# Patient Record
Sex: MALE | Race: WHITE | NOT HISPANIC OR LATINO | Employment: FULL TIME | URBAN - NONMETROPOLITAN AREA
[De-identification: names, ages, dates, MRNs, and addresses within clinical notes are randomized per-mention and may not be internally consistent; named-entity substitution may affect disease eponyms.]

---

## 2018-11-14 ENCOUNTER — HOSPITAL ENCOUNTER (OUTPATIENT)
Dept: LAB | Facility: MEDICAL CENTER | Age: 38
End: 2018-11-14
Attending: NURSE PRACTITIONER
Payer: COMMERCIAL

## 2018-11-14 ENCOUNTER — OFFICE VISIT (OUTPATIENT)
Dept: MEDICAL GROUP | Facility: PHYSICIAN GROUP | Age: 38
End: 2018-11-14
Payer: COMMERCIAL

## 2018-11-14 VITALS
DIASTOLIC BLOOD PRESSURE: 78 MMHG | HEART RATE: 61 BPM | WEIGHT: 256 LBS | SYSTOLIC BLOOD PRESSURE: 122 MMHG | TEMPERATURE: 98.2 F | BODY MASS INDEX: 37.92 KG/M2 | HEIGHT: 69 IN | RESPIRATION RATE: 16 BRPM | OXYGEN SATURATION: 100 %

## 2018-11-14 DIAGNOSIS — E78.5 DYSLIPIDEMIA: ICD-10-CM

## 2018-11-14 DIAGNOSIS — E66.09 OBESITY DUE TO EXCESS CALORIES WITHOUT SERIOUS COMORBIDITY, UNSPECIFIED CLASSIFICATION: ICD-10-CM

## 2018-11-14 DIAGNOSIS — M25.562 CHRONIC PAIN OF BOTH KNEES: ICD-10-CM

## 2018-11-14 DIAGNOSIS — M25.561 CHRONIC PAIN OF BOTH KNEES: ICD-10-CM

## 2018-11-14 DIAGNOSIS — G89.29 CHRONIC PAIN OF BOTH KNEES: ICD-10-CM

## 2018-11-14 PROCEDURE — 80053 COMPREHEN METABOLIC PANEL: CPT

## 2018-11-14 PROCEDURE — 99204 OFFICE O/P NEW MOD 45 MIN: CPT | Performed by: NURSE PRACTITIONER

## 2018-11-14 PROCEDURE — 36415 COLL VENOUS BLD VENIPUNCTURE: CPT

## 2018-11-14 PROCEDURE — 80061 LIPID PANEL: CPT

## 2018-11-14 RX ORDER — MELOXICAM 15 MG/1
15 TABLET ORAL
Qty: 30 TAB | Refills: 2 | Status: SHIPPED | OUTPATIENT
Start: 2018-11-14 | End: 2020-11-10

## 2018-11-14 ASSESSMENT — PATIENT HEALTH QUESTIONNAIRE - PHQ9: CLINICAL INTERPRETATION OF PHQ2 SCORE: 0

## 2018-11-14 ASSESSMENT — PAIN SCALES - GENERAL: PAINLEVEL: 2=MINIMAL-SLIGHT

## 2018-11-14 NOTE — ASSESSMENT & PLAN NOTE
Patient reports a history of elevated LDL cholesterol, he is interested in having lab work done today.  He is overweight, BMI is 37.8.  Working on dietary modifications to lose weight.

## 2018-11-14 NOTE — ASSESSMENT & PLAN NOTE
Patient is a 38-year-old male with bilateral chronic knee pain, persisting for many years.  Right is worse than left.  He was previously under the care of a knee specialist in [place], he had multiple cortisone injections and Synvisc in bilateral knees without significant improvement in his symptoms.  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  He tells me he was diagnosed with arthritis.  Rarely using anti-inflammatories, has a high pain threshold.  He has also tried physical therapy in the past for about 3-4 months without any improvement in his symptoms.  He denies mechanical symptoms.

## 2018-11-14 NOTE — PROGRESS NOTES
[facility]  Primary Care Office Visit - Problem-Oriented        History:     John Domínguez is a 38 y.o. male who is here today to discuss Knee Pain (bilateral )      Bilateral knee pain  Patient is a 38-year-old male with bilateral chronic knee pain, persisting for many years.  Right is worse than left.  He was previously under the care of a knee specialist in [place], he had multiple cortisone injections and Synvisc in bilateral knees without significant improvement in his symptoms.  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  He tells me he was diagnosed with arthritis.  Rarely using anti-inflammatories, has a high pain threshold.  He has also tried physical therapy in the past for about 3-4 months without any improvement in his symptoms.  He denies mechanical symptoms.           Dyslipidemia  Patient reports a history of elevated LDL cholesterol, he is interested in having lab work done today.  He is overweight, BMI is 37.8.  Working on dietary modifications to lose weight.         No past medical history on file.  No past surgical history on file.  Social History     Social History   • Marital status:      Spouse name: N/A   • Number of children: N/A   • Years of education: N/A     Occupational History   • Not on file.     Social History Main Topics   • Smoking status: Never Smoker   • Smokeless tobacco: Never Used   • Alcohol use No   • Drug use: No   • Sexual activity: No     Other Topics Concern   • Not on file     Social History Narrative   • No narrative on file     History   Smoking Status   • Never Smoker   Smokeless Tobacco   • Never Used     No family history on file.  Not on File    Problem List:     Patient Active Problem List    Diagnosis Date Noted   • Bilateral knee pain 11/14/2018   • Obesity due to excess calories without serious comorbidity 11/14/2018   • Dyslipidemia 11/14/2018         Medications:     Current  "Outpatient Prescriptions:   •  meloxicam (MOBIC) 15 MG tablet, Take 1 Tab by mouth 1 time daily as needed. Take with food., Disp: 30 Tab, Rfl: 2      Review of Systems:     Pertinent positives as per HPI, all other systems reviewed and WNL     Physical Assessment:     VS: /78 (BP Location: Right arm, Patient Position: Sitting, BP Cuff Size: Adult)   Pulse 61   Temp 36.8 °C (98.2 °F) (Temporal)   Resp 16   Ht 1.753 m (5' 9\")   Wt 116.1 kg (256 lb)   SpO2 100%   BMI 37.80 kg/m²     General: Well-developed, well-nourished, male, obese     Head: PERRL, EOMI. Normocephalic. No facial asymmetry noted.  Cardiovasc:  RRR, no MRG. No thrills or bruits. Pulses 2+ and symmetric at all distal extremities.  Pulmonary: Lungs clear bilaterally.  Normal respiratory effort. No wheeze or crackles.   Neuro: Alert and oriented, CNs II-XII intact, no focal deficits.  Skin:No rashes noted. Skin warm, dry, intact    Psych: Dressed appropriately for the weather, pleasant and conversant.  Affect, mood & judgment appropriate.    Assessment/Plan:   John was seen today for knee pain.    Diagnoses and all orders for this visit:    Chronic pain of both knees, uncontrolled   -Try meloxicam 15 mg once daily as needed, take with food.  Obtain updated imaging, referral to orthopedics for second opinion.  May be a great candidate for sports medicine referral to Dr. Samano.  Recommend weight loss.  -     REFERRAL TO ORTHOPEDICS  -     DX-KNEE COMPLETE 4+ RIGHT; Future  -     DX-KNEE COMPLETE 4+ LEFT; Future  -     meloxicam (MOBIC) 15 MG tablet; Take 1 Tab by mouth 1 time daily as needed. Take with food.    Dyslipidemia, unclear control, check labs   -     Lipid Profile; Future  -     COMP METABOLIC PANEL; Future    Obesity due to excess calories without serious comorbidity, unspecified classification  -Diet, exercise, weight loss discussed    Follow up in 1 month     Patient is agreeable to the above plan and voiced understanding. All " questions answered.     Please note that this dictation was created using voice recognition software. I have made every reasonable attempt to correct obvious errors, but I expect that there are errors of grammar and possibly content that I did not discover before finalizing the note.      STACY Loo  11/14/2018, 11:54 AM

## 2018-11-15 LAB
ALBUMIN SERPL BCP-MCNC: 4.5 G/DL (ref 3.2–4.9)
ALBUMIN/GLOB SERPL: 1.5 G/DL
ALP SERPL-CCNC: 38 U/L (ref 30–99)
ALT SERPL-CCNC: 15 U/L (ref 2–50)
ANION GAP SERPL CALC-SCNC: 8 MMOL/L (ref 0–11.9)
AST SERPL-CCNC: 16 U/L (ref 12–45)
BILIRUB SERPL-MCNC: 0.5 MG/DL (ref 0.1–1.5)
BUN SERPL-MCNC: 13 MG/DL (ref 8–22)
CALCIUM SERPL-MCNC: 9.2 MG/DL (ref 8.5–10.5)
CHLORIDE SERPL-SCNC: 102 MMOL/L (ref 96–112)
CHOLEST SERPL-MCNC: 236 MG/DL (ref 100–199)
CO2 SERPL-SCNC: 28 MMOL/L (ref 20–33)
CREAT SERPL-MCNC: 1.03 MG/DL (ref 0.5–1.4)
FASTING STATUS PATIENT QL REPORTED: NORMAL
GLOBULIN SER CALC-MCNC: 3 G/DL (ref 1.9–3.5)
GLUCOSE SERPL-MCNC: 84 MG/DL (ref 65–99)
HDLC SERPL-MCNC: 51 MG/DL
LDLC SERPL CALC-MCNC: 161 MG/DL
POTASSIUM SERPL-SCNC: 4 MMOL/L (ref 3.6–5.5)
PROT SERPL-MCNC: 7.5 G/DL (ref 6–8.2)
SODIUM SERPL-SCNC: 138 MMOL/L (ref 135–145)
TRIGL SERPL-MCNC: 122 MG/DL (ref 0–149)

## 2018-12-13 ENCOUNTER — NON-PROVIDER VISIT (OUTPATIENT)
Dept: URGENT CARE | Facility: PHYSICIAN GROUP | Age: 38
End: 2018-12-13
Payer: COMMERCIAL

## 2018-12-13 ENCOUNTER — APPOINTMENT (OUTPATIENT)
Dept: RADIOLOGY | Facility: IMAGING CENTER | Age: 38
End: 2018-12-13
Attending: NURSE PRACTITIONER
Payer: COMMERCIAL

## 2018-12-13 DIAGNOSIS — G89.29 CHRONIC PAIN OF BOTH KNEES: ICD-10-CM

## 2018-12-13 DIAGNOSIS — M25.561 CHRONIC PAIN OF BOTH KNEES: ICD-10-CM

## 2018-12-13 DIAGNOSIS — M25.562 CHRONIC PAIN OF BOTH KNEES: ICD-10-CM

## 2018-12-13 PROCEDURE — 73564 X-RAY EXAM KNEE 4 OR MORE: CPT | Mod: TC,FY,RT | Performed by: EMERGENCY MEDICINE

## 2019-02-12 ENCOUNTER — OFFICE VISIT (OUTPATIENT)
Dept: URGENT CARE | Facility: PHYSICIAN GROUP | Age: 39
End: 2019-02-12
Payer: COMMERCIAL

## 2019-02-12 VITALS
DIASTOLIC BLOOD PRESSURE: 80 MMHG | HEART RATE: 96 BPM | TEMPERATURE: 98.3 F | SYSTOLIC BLOOD PRESSURE: 116 MMHG | HEIGHT: 69 IN | RESPIRATION RATE: 16 BRPM | WEIGHT: 192 LBS | OXYGEN SATURATION: 95 % | BODY MASS INDEX: 28.44 KG/M2

## 2019-02-12 DIAGNOSIS — J10.1 INFLUENZA A: ICD-10-CM

## 2019-02-12 DIAGNOSIS — R05.9 COUGH: ICD-10-CM

## 2019-02-12 LAB
FLUAV+FLUBV AG SPEC QL IA: POSITIVE
INT CON NEG: NEGATIVE
INT CON POS: POSITIVE

## 2019-02-12 PROCEDURE — 87804 INFLUENZA ASSAY W/OPTIC: CPT | Performed by: PHYSICIAN ASSISTANT

## 2019-02-12 PROCEDURE — 99214 OFFICE O/P EST MOD 30 MIN: CPT | Performed by: PHYSICIAN ASSISTANT

## 2019-02-12 RX ORDER — DEXTROMETHORPHAN HYDROBROMIDE AND PROMETHAZINE HYDROCHLORIDE 15; 6.25 MG/5ML; MG/5ML
5 SYRUP ORAL EVERY 4 HOURS PRN
Qty: 120 ML | Refills: 0 | Status: SHIPPED | OUTPATIENT
Start: 2019-02-12 | End: 2020-11-10

## 2019-02-12 RX ORDER — OSELTAMIVIR PHOSPHATE 75 MG/1
75 CAPSULE ORAL 2 TIMES DAILY
Qty: 10 CAP | Refills: 0 | Status: SHIPPED | OUTPATIENT
Start: 2019-02-12 | End: 2019-02-17

## 2019-02-12 NOTE — PROGRESS NOTES
"  Chief Complaint   Patient presents with   • Cough       HISTORY OF PRESENT ILLNESS: Patient is a 38 y.o. male who presents today because he has a 1-2-day history of fevers, chills, body aches and a cough.  He tried some over-the-counter cough medication but has not been helping very much.    Patient Active Problem List    Diagnosis Date Noted   • Bilateral knee pain 11/14/2018   • Obesity due to excess calories without serious comorbidity 11/14/2018   • Dyslipidemia 11/14/2018       Allergies:Patient has no known allergies.    Current Outpatient Prescriptions Ordered in [facility]   Medication Sig Dispense Refill   • promethazine-dextromethorphan (PROMETHAZINE-DM) 6.25-15 MG/5ML syrup Take 5 mL by mouth every four hours as needed for Cough. 120 mL 0   • oseltamivir (TAMIFLU) 75 MG Cap Take 1 Cap by mouth 2 times a day for 5 days. 10 Cap 0   • meloxicam (MOBIC) 15 MG tablet Take 1 Tab by mouth 1 time daily as needed. Take with food. 30 Tab 2     No current Epic-ordered facility-administered medications on file.        No past medical history on file.    Social History   Substance Use Topics   • Smoking status: Never Smoker   • Smokeless tobacco: Never Used   • Alcohol use No       No family status information on file.   No family history on file.    ROS:  Review of Systems   Constitutional: Positive for fever, chills, myalgias and malaise/fatigue.   HENT: Negative for ear pain, nosebleeds, congestion, sore throat and neck pain.    Eyes: Negative for blurred vision.   Respiratory: Positive for cough, sputum production, no shortness of breath and wheezing.    Cardiovascular: Negative for chest pain, palpitations, orthopnea and leg swelling.   Gastrointestinal: Negative for heartburn, nausea, vomiting and abdominal pain.   Genitourinary: Negative for dysuria, urgency and frequency.     Exam:  Blood pressure 116/80, pulse 96, temperature 36.8 °C (98.3 °F), temperature source Temporal, resp. rate 16, height 1.753 m (5' 9\"), " weight 87.1 kg (192 lb), SpO2 95 %.  General:  Well nourished, well developed male in NAD  Head:Normocephalic, atraumatic  Eyes: PERRLA, EOM within normal limits, no conjunctival injection, no scleral icterus, visual fields and acuity grossly intact.  Ears: Normal shape and symmetry, no tenderness, no discharge. External canals are without any significant edema or erythema. Tympanic membranes are without any inflammation, no effusion. Gross auditory acuity is intact  Nose: Symmetrical without tenderness, no discharge.  Mouth: reasonable hygiene, no erythema exudates or tonsillar enlargement.  Neck: no masses, range of motion within normal limits, no tracheal deviation. No obvious thyroid enlargement.  Pulmonary: chest is symmetrical with respiration, no wheezes, crackles, or rhonchi.  Cardiovascular: regular rate and rhythm without murmurs, rubs, or gallops.  Extremities: no clubbing, cyanosis, or edema.    Positive for influenza A    Please note that this dictation was created using voice recognition software. I have made every reasonable attempt to correct obvious errors, but I expect that there are errors of grammar and possibly content that I did not discover before finalizing the note.    Assessment/Plan:  1. Influenza A  oseltamivir (TAMIFLU) 75 MG Cap   2. Cough  POCT Influenza A/B    promethazine-dextromethorphan (PROMETHAZINE-DM) 6.25-15 MG/5ML syrup   Increase p.o. fluids, Tylenol or ibuprofen as tolerated.  Followup with primary care in the next 7-10 days if not significantly improving, return to the urgent care or go to the emergency room sooner for any worsening of symptoms.

## 2019-02-12 NOTE — LETTER
February 12, 2019         Patient: John Domínguez   YOB: 1980   Date of Visit: 2/12/2019           To Whom it May Concern:    John Domínguez was seen in my clinic on 2/12/2019. He may return to work on 02/16/2019, please excuse any recent absences..    If you have any questions or concerns, please don't hesitate to call.        Sincerely,           Elio Johnston P.A.-C.  Electronically Signed

## 2020-08-16 ENCOUNTER — OFFICE VISIT (OUTPATIENT)
Dept: URGENT CARE | Facility: CLINIC | Age: 40
End: 2020-08-16
Payer: COMMERCIAL

## 2020-08-16 VITALS
SYSTOLIC BLOOD PRESSURE: 128 MMHG | OXYGEN SATURATION: 98 % | WEIGHT: 248 LBS | DIASTOLIC BLOOD PRESSURE: 84 MMHG | HEIGHT: 69 IN | RESPIRATION RATE: 16 BRPM | TEMPERATURE: 97.2 F | HEART RATE: 81 BPM | BODY MASS INDEX: 36.73 KG/M2

## 2020-08-16 DIAGNOSIS — A60.00 GENITAL HERPES SIMPLEX, UNSPECIFIED SITE: ICD-10-CM

## 2020-08-16 PROCEDURE — 99214 OFFICE O/P EST MOD 30 MIN: CPT | Performed by: NURSE PRACTITIONER

## 2020-08-16 RX ORDER — VALACYCLOVIR HYDROCHLORIDE 500 MG/1
500 TABLET, FILM COATED ORAL 2 TIMES DAILY
Qty: 20 TAB | Refills: 0 | Status: SHIPPED | OUTPATIENT
Start: 2020-08-16 | End: 2020-08-16

## 2020-08-16 RX ORDER — VALACYCLOVIR HYDROCHLORIDE 500 MG/1
500 TABLET, FILM COATED ORAL 2 TIMES DAILY
Qty: 20 TAB | Refills: 0 | Status: SHIPPED | OUTPATIENT
Start: 2020-08-16 | End: 2020-11-10 | Stop reason: SDUPTHER

## 2020-08-16 ASSESSMENT — ENCOUNTER SYMPTOMS
MEMORY LOSS: 0
MYALGIAS: 0
ORTHOPNEA: 0
VOMITING: 0
SORE THROAT: 0
CHILLS: 0
WHEEZING: 0
DIZZINESS: 0
NAUSEA: 0
COUGH: 0
SHORTNESS OF BREATH: 0
DIARRHEA: 0
CONSTIPATION: 0
TINGLING: 0
HEARTBURN: 0
BACK PAIN: 0
FEVER: 0
HEADACHES: 0
PALPITATIONS: 0

## 2020-08-17 NOTE — PROGRESS NOTES
"Subjective:      John Domínguez is a 39 y.o. male who presents with Herpes Zoster (rash between legs x 3 days)            Patient presents to urgent care with complaint of rash in his groin.  Patient has known history of genital herpes.  He has run out of his valacyclovir.  He reports that his prodrome usually starts with a rash on the legs before he has full breakout.  Requests refill of medication today    Rash  This is a new problem. Episode onset: In the past 3 days. The problem is unchanged. The affected locations include the groin. The rash is characterized by redness, itchiness and pain. Pertinent negatives include no cough, diarrhea, fever, joint pain, shortness of breath, sore throat or vomiting. Past treatments include nothing. The treatment provided no relief. (Genital herpes)       Review of Systems   Constitutional: Negative for chills and fever.   HENT: Negative for ear pain and sore throat.    Respiratory: Negative for cough, shortness of breath and wheezing.    Cardiovascular: Negative for chest pain, palpitations, orthopnea and leg swelling.   Gastrointestinal: Negative for constipation, diarrhea, heartburn, nausea and vomiting.   Musculoskeletal: Negative for back pain, joint pain and myalgias.   Skin: Positive for rash.   Neurological: Negative for dizziness, tingling and headaches.   Psychiatric/Behavioral: Negative for memory loss and suicidal ideas.   All other systems reviewed and are negative.         Objective:     /84 (BP Location: Left arm, Patient Position: Sitting, BP Cuff Size: Adult)   Pulse 81   Temp 36.2 °C (97.2 °F) (Temporal)   Resp 16   Ht 1.753 m (5' 9\")   Wt 112.5 kg (248 lb)   SpO2 98%   BMI 36.62 kg/m²      Physical Exam  Vitals signs reviewed.   Constitutional:       Appearance: Normal appearance.   HENT:      Head: Normocephalic.      Right Ear: External ear normal.      Left Ear: External ear normal.      Nose: Nose normal. No congestion.      Mouth/Throat:      " Mouth: Mucous membranes are moist.   Eyes:      Extraocular Movements: Extraocular movements intact.      Conjunctiva/sclera: Conjunctivae normal.   Neck:      Musculoskeletal: Normal range of motion.   Cardiovascular:      Rate and Rhythm: Normal rate and regular rhythm.      Pulses: Normal pulses.      Heart sounds: Normal heart sounds. No murmur.   Pulmonary:      Effort: Pulmonary effort is normal.      Breath sounds: Normal breath sounds. No wheezing.   Abdominal:      General: Abdomen is flat. Bowel sounds are normal.      Palpations: Abdomen is soft.      Tenderness: There is no abdominal tenderness.   Musculoskeletal: Normal range of motion.   Lymphadenopathy:      Cervical: No cervical adenopathy.   Skin:     General: Skin is warm and dry.      Capillary Refill: Capillary refill takes less than 2 seconds.      Findings: Rash present. Rash is vesicular.   Neurological:      Mental Status: He is alert and oriented to person, place, and time.   Psychiatric:         Mood and Affect: Mood normal.         Behavior: Behavior normal.                 Assessment/Plan:        1. Genital herpes simplex, unspecified site  valACYclovir (VALTREX) 500 MG Tab     Patient requests refill of his valacyclovir for herpes prodrome.  Patient is given extra medication until he is able to follow-up with his PCP to take at onset of prodrome.    Plan of care, medications and treatments reviewed with patient and or guardian.  Patient and or guardian voices understanding and agrees with the instructions provided. After visit summary reviewed with patient. Patient and or guardian understand the parameters for reevaluation and ER precautions discussed.     Please note that this dictation was created using voice recognition software. I have made every reasonable attempt to correct obvious errors, but I expect that there are errors of grammar and possibly content that I did not discover before finalizing the note.

## 2020-11-10 ENCOUNTER — OFFICE VISIT (OUTPATIENT)
Dept: MEDICAL GROUP | Facility: PHYSICIAN GROUP | Age: 40
End: 2020-11-10
Payer: COMMERCIAL

## 2020-11-10 ENCOUNTER — TELEPHONE (OUTPATIENT)
Dept: MEDICAL GROUP | Facility: PHYSICIAN GROUP | Age: 40
End: 2020-11-10

## 2020-11-10 VITALS
SYSTOLIC BLOOD PRESSURE: 104 MMHG | TEMPERATURE: 97.9 F | RESPIRATION RATE: 16 BRPM | OXYGEN SATURATION: 97 % | DIASTOLIC BLOOD PRESSURE: 70 MMHG | HEIGHT: 69 IN | BODY MASS INDEX: 37.47 KG/M2 | HEART RATE: 64 BPM | WEIGHT: 253 LBS

## 2020-11-10 DIAGNOSIS — E66.9 OBESITY (BMI 35.0-39.9 WITHOUT COMORBIDITY): ICD-10-CM

## 2020-11-10 DIAGNOSIS — Z30.2 ENCOUNTER FOR STERILIZATION: ICD-10-CM

## 2020-11-10 DIAGNOSIS — A60.00 GENITAL HERPES SIMPLEX, UNSPECIFIED SITE: ICD-10-CM

## 2020-11-10 DIAGNOSIS — Z30.09 VASECTOMY EVALUATION: ICD-10-CM

## 2020-11-10 DIAGNOSIS — E66.9 OBESITY (BMI 30-39.9): ICD-10-CM

## 2020-11-10 DIAGNOSIS — M25.561 CHRONIC PAIN OF BOTH KNEES: ICD-10-CM

## 2020-11-10 DIAGNOSIS — M25.562 CHRONIC PAIN OF BOTH KNEES: ICD-10-CM

## 2020-11-10 DIAGNOSIS — R14.1 GAS PAIN: ICD-10-CM

## 2020-11-10 DIAGNOSIS — E78.5 DYSLIPIDEMIA: ICD-10-CM

## 2020-11-10 DIAGNOSIS — G89.29 CHRONIC PAIN OF BOTH KNEES: ICD-10-CM

## 2020-11-10 PROCEDURE — 99214 OFFICE O/P EST MOD 30 MIN: CPT | Performed by: FAMILY MEDICINE

## 2020-11-10 RX ORDER — VALACYCLOVIR HYDROCHLORIDE 500 MG/1
500 TABLET, FILM COATED ORAL 2 TIMES DAILY
Qty: 20 TAB | Refills: 0 | Status: SHIPPED | OUTPATIENT
Start: 2020-11-10 | End: 2020-11-20

## 2020-11-10 ASSESSMENT — PATIENT HEALTH QUESTIONNAIRE - PHQ9: CLINICAL INTERPRETATION OF PHQ2 SCORE: 0

## 2020-11-10 NOTE — ASSESSMENT & PLAN NOTE
Patient is interested in ref for urology consultation.   His wife had a C section complication with blood clots after surgery, cardiomegaly and in the process of getting bariatric surgery for obesity possibly.   They have one 18 month old.   I encouraged them to be very sure of not wanting another biologic child in the future and explained a vasectomy is permanent and can only be surgically reversed.   He does still want ref to urology which I provided.

## 2020-11-10 NOTE — PROGRESS NOTES
"Subjective:   John Domínguez is a 40 y.o. male here today for evaluation and management of:     Vasectomy evaluation  Patient is interested in ref for urology consultation.   His wife had a C section complication with blood clots after surgery, cardiomegaly and in the process of getting bariatric surgery for obesity possibly.   They have one 18 month old.   I encouraged them to be very sure of not wanting another biologic child in the future and explained a vasectomy is permanent and can only be surgically reversed.   He does still want ref to urology which I provided.     Gas pain  Patient has noted a gas bubble that is difficult to burp out. Has relief after burping.   He has sometimes left upper abdomen sharp pain.   Happens after meals, occurs at rest not with exertion. Does not wake him at night. Not certain if any particular foods trigger it.   I recommended weight loss and diet low in processed foods. He is interested in keto diet and intermittent fasting.            Current medicines (including changes today)  Current Outpatient Medications   Medication Sig Dispense Refill   • valACYclovir (VALTREX) 500 MG Tab Take 1 Tab by mouth 2 times a day for 10 days. 20 Tab 0     No current facility-administered medications for this visit.      He  has no past medical history on file.    ROS  No chest pain, no shortness of breath, no abdominal pain       Objective:     /70   Pulse 64   Temp 36.6 °C (97.9 °F) (Temporal)   Resp 16   Ht 1.753 m (5' 9\")   Wt 114.8 kg (253 lb)   SpO2 97%  Body mass index is 37.36 kg/m².   Physical Exam:  Constitutional: Alert, no distress.  Skin: Warm, dry, good turgor, no rashes in visible areas.  Eye: Equal, round and reactive, conjunctiva clear, lids normal.  ENMT: Lips without lesions, good dentition, oropharynx clear.  Neck: Trachea midline, no masses, no thyromegaly. No cervical or supraclavicular lymphadenopathy  Respiratory: Unlabored respiratory effort, lungs clear to " auscultation, no wheezes, no ronchi.  Cardiovascular: Normal S1, S2, no murmur, no edema.  Abdomen: Soft, non-tender, no masses, no hepatosplenomegaly.  Psych: Alert and oriented x3, normal affect and mood.        Assessment and Plan:   The following treatment plan was discussed    1. Genital herpes simplex, unspecified site  As needed valtrex  - valACYclovir (VALTREX) 500 MG Tab; Take 1 Tab by mouth 2 times a day for 10 days.  Dispense: 20 Tab; Refill: 0    2. Encounter for sterilization  - REFERRAL TO UROLOGY    3. Vasectomy evaluation  Ref to urology provided    4. Gas pain  Encouraged weight loss, diet changes.     5. Dyslipidemia  - Comp Metabolic Panel; Future  - Lipid Profile; Future      Followup: Return in about 3 months (around 2/10/2021) for lab review, dyslipidemia, gas pain.

## 2020-11-10 NOTE — ASSESSMENT & PLAN NOTE
Sometimes feels a painful snapping in his left knee. Has bilateral knee pain. Seen by AMIRAH  Declines PT at this time.   I explained isometric knee strengthening exercises.

## 2020-11-10 NOTE — TELEPHONE ENCOUNTER
Wes Penn,   Had a great visit with John,   His wife Carolee is established with you, he tried to get an appointment with you also.   Since he lives in [place], would you be able to take him as your patient too?   Vik

## 2020-11-10 NOTE — PATIENT INSTRUCTIONS
Fasting labs in 3 months  Abdominal Bloating  When you have abdominal bloating, your abdomen may feel full, tight, or painful. It may also look bigger than normal or swollen (distended). Common causes of abdominal bloating include:  · Swallowing air.  · Constipation.  · Problems digesting food.  · Eating too much.  · Irritable bowel syndrome. This is a condition that affects the large intestine.  · Lactose intolerance. This is an inability to digest lactose, a natural sugar in dairy products.  · Celiac disease. This is a condition that affects the ability to digest gluten, a protein found in some grains.  · Gastroparesis. This is a condition that slows down the movement of food in the stomach and small intestine. It is more common in people with diabetes mellitus.  · Gastroesophageal reflux disease (GERD). This is a digestive condition that makes stomach acid flow back into the esophagus.  · Urinary retention. This means that the body is holding onto urine, and the bladder cannot be emptied all the way.  Follow these instructions at home:  Eating and drinking  · Avoid eating too much.  · Try not to swallow air while talking or eating.  · Avoid eating while lying down.  · Avoid these foods and drinks:  ? Foods that cause gas, such as broccoli, cabbage, cauliflower, and baked beans.  ? Carbonated drinks.  ? Hard candy.  ? Chewing gum.  Medicines  · Take over-the-counter and prescription medicines only as told by your health care provider.  · Take probiotic medicines. These medicines contain live bacteria or yeasts that can help digestion.  · Take coated peppermint oil capsules.  Activity  · Try to exercise regularly. Exercise may help to relieve bloating that is caused by gas and relieve constipation.  General instructions  · Keep all follow-up visits as told by your health care provider. This is important.  Contact a health care provider if:  · You have nausea and vomiting.  · You have diarrhea.  · You have abdominal  pain.  · You have unusual weight loss or weight gain.  · You have severe pain, and medicines do not help.  Get help right away if:  · You have severe chest pain.  · You have trouble breathing.  · You have shortness of breath.  · You have trouble urinating.  · You have darker urine than normal.  · You have blood in your stools or have dark, tarry stools.  Summary  · Abdominal bloating means that the abdomen is swollen.  · Common causes of abdominal bloating are swallowing air, constipation, and problems digesting food.  · Avoid eating too much and avoid swallowing air.  · Avoid foods that cause gas, carbonated drinks, hard candy, and chewing gum.  This information is not intended to replace advice given to you by your health care provider. Make sure you discuss any questions you have with your health care provider.  Document Released: 01/19/2018 Document Revised: 04/06/2020 Document Reviewed: 01/19/2018  Elsevier Patient Education © 2020 [employer]

## 2020-11-10 NOTE — ASSESSMENT & PLAN NOTE
Patient has noted a gas bubble that is difficult to burp out. Has relief after burping.   He has sometimes left upper abdomen sharp pain.   Happens after meals, occurs at rest not with exertion. Does not wake him at night. Not certain if any particular foods trigger it.   I recommended weight loss and diet low in processed foods. He is interested in keto diet and intermittent fasting.

## 2020-11-11 NOTE — TELEPHONE ENCOUNTER
Wes Merida,  Yes, I would be happy to see John.  I will have our team give him a call to schedule with me in March.  Take care,  Isamar Arrieta,   Will you call and schedule patient to see me in March for lab review and to establish care?  Thanks

## 2021-03-05 ENCOUNTER — HOSPITAL ENCOUNTER (OUTPATIENT)
Dept: LAB | Facility: MEDICAL CENTER | Age: 41
End: 2021-03-05
Attending: FAMILY MEDICINE
Payer: COMMERCIAL

## 2021-03-05 DIAGNOSIS — E78.5 DYSLIPIDEMIA: ICD-10-CM

## 2021-03-05 PROCEDURE — 80061 LIPID PANEL: CPT

## 2021-03-05 PROCEDURE — 36415 COLL VENOUS BLD VENIPUNCTURE: CPT

## 2021-03-05 PROCEDURE — 80053 COMPREHEN METABOLIC PANEL: CPT

## 2021-03-06 LAB
ALBUMIN SERPL BCP-MCNC: 4.4 G/DL (ref 3.2–4.9)
ALBUMIN/GLOB SERPL: 1.5 G/DL
ALP SERPL-CCNC: 49 U/L (ref 30–99)
ALT SERPL-CCNC: 26 U/L (ref 2–50)
ANION GAP SERPL CALC-SCNC: 11 MMOL/L (ref 7–16)
AST SERPL-CCNC: 20 U/L (ref 12–45)
BILIRUB SERPL-MCNC: 0.4 MG/DL (ref 0.1–1.5)
BUN SERPL-MCNC: 18 MG/DL (ref 8–22)
CALCIUM SERPL-MCNC: 9.2 MG/DL (ref 8.5–10.5)
CHLORIDE SERPL-SCNC: 102 MMOL/L (ref 96–112)
CHOLEST SERPL-MCNC: 222 MG/DL (ref 100–199)
CO2 SERPL-SCNC: 24 MMOL/L (ref 20–33)
CREAT SERPL-MCNC: 1.18 MG/DL (ref 0.5–1.4)
FASTING STATUS PATIENT QL REPORTED: NORMAL
GLOBULIN SER CALC-MCNC: 2.9 G/DL (ref 1.9–3.5)
GLUCOSE SERPL-MCNC: 84 MG/DL (ref 65–99)
HDLC SERPL-MCNC: 51 MG/DL
LDLC SERPL CALC-MCNC: 157 MG/DL
POTASSIUM SERPL-SCNC: 4.1 MMOL/L (ref 3.6–5.5)
PROT SERPL-MCNC: 7.3 G/DL (ref 6–8.2)
SODIUM SERPL-SCNC: 137 MMOL/L (ref 135–145)
TRIGL SERPL-MCNC: 70 MG/DL (ref 0–149)

## 2021-03-11 ENCOUNTER — OFFICE VISIT (OUTPATIENT)
Dept: MEDICAL GROUP | Facility: PHYSICIAN GROUP | Age: 41
End: 2021-03-11
Payer: COMMERCIAL

## 2021-03-11 VITALS
WEIGHT: 245 LBS | HEART RATE: 76 BPM | DIASTOLIC BLOOD PRESSURE: 82 MMHG | TEMPERATURE: 96.7 F | HEIGHT: 69 IN | SYSTOLIC BLOOD PRESSURE: 112 MMHG | OXYGEN SATURATION: 97 % | BODY MASS INDEX: 36.29 KG/M2

## 2021-03-11 DIAGNOSIS — Z13.6 SCREENING FOR CARDIOVASCULAR CONDITION: ICD-10-CM

## 2021-03-11 DIAGNOSIS — L85.8 KERATOSIS PILARIS: ICD-10-CM

## 2021-03-11 DIAGNOSIS — G89.29 CHRONIC PAIN OF BOTH KNEES: ICD-10-CM

## 2021-03-11 DIAGNOSIS — M25.562 CHRONIC PAIN OF BOTH KNEES: ICD-10-CM

## 2021-03-11 DIAGNOSIS — R14.1 GAS PAIN: ICD-10-CM

## 2021-03-11 DIAGNOSIS — E78.5 DYSLIPIDEMIA: ICD-10-CM

## 2021-03-11 DIAGNOSIS — M25.561 CHRONIC PAIN OF BOTH KNEES: ICD-10-CM

## 2021-03-11 DIAGNOSIS — Z30.09 VASECTOMY EVALUATION: ICD-10-CM

## 2021-03-11 DIAGNOSIS — F41.9 ANXIETY: ICD-10-CM

## 2021-03-11 DIAGNOSIS — Z12.5 SCREENING FOR PROSTATE CANCER: ICD-10-CM

## 2021-03-11 PROCEDURE — 99214 OFFICE O/P EST MOD 30 MIN: CPT | Performed by: NURSE PRACTITIONER

## 2021-03-11 RX ORDER — CEPHALEXIN 500 MG/1
CAPSULE ORAL
COMMUNITY
Start: 2021-01-19 | End: 2021-03-12

## 2021-03-11 ASSESSMENT — PATIENT HEALTH QUESTIONNAIRE - PHQ9: CLINICAL INTERPRETATION OF PHQ2 SCORE: 0

## 2021-03-11 NOTE — ASSESSMENT & PLAN NOTE
Patient is 40-year-old male here to establish care with me today.  Reports sensation of air bubble in his epigastrium and chest has greatly improved since he has changed his diet and lost some weight.  He is no longer drinking sodas or greasy foods.  Denies blood in stool dark black stool.

## 2021-03-11 NOTE — ASSESSMENT & PLAN NOTE
Patient is 40-year-old male here to establish care with me today.  Chronic bilateral knee pain.  Managed by AMIRAH.  Has received cortisone and Synvisc injections.  Seems to be managing with injections and lifestyle measures.  Does prohibit him from running.  Has been working on weight loss.

## 2021-03-11 NOTE — PATIENT INSTRUCTIONS

## 2021-03-12 PROBLEM — E66.9 OBESITY (BMI 30-39.9): Status: RESOLVED | Noted: 2020-11-10 | Resolved: 2021-03-12

## 2021-03-12 PROBLEM — L85.8 KERATOSIS PILARIS: Status: ACTIVE | Noted: 2021-03-12

## 2021-03-12 PROBLEM — Z30.09 VASECTOMY EVALUATION: Status: RESOLVED | Noted: 2020-11-10 | Resolved: 2021-03-12

## 2021-03-12 PROBLEM — F41.9 ANXIETY: Status: ACTIVE | Noted: 2021-03-12

## 2021-03-12 NOTE — ASSESSMENT & PLAN NOTE
Patient is 40-year-old male here to establish care with me today.  Does have history of dyslipidemia, managed with lifestyle measures. The 10-year ASCVD risk score ([place] VERON[place] Jr., et al., 2013) is: 1.1%  Component      Latest Ref Rng & Units 11/14/2018 3/5/2021           9:14 AM  8:33 AM   Cholesterol,Tot      100 - 199 mg/dL 236 (H) 222 (H)   Triglycerides      0 - 149 mg/dL 122 70   HDL      >=40 mg/dL 51 51   LDL      <100 mg/dL 161 (H) 157 (H)
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
## 2021-03-12 NOTE — ASSESSMENT & PLAN NOTE
Patient is 40-year-old male here to establish care with me today.  Reports 2-week onset of bumpy rash on upper arms and medial thighs.  Nonpruritic.  Improving since it started.  He wonders what it is.
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
## 2021-04-05 ENCOUNTER — TELEPHONE (OUTPATIENT)
Dept: MEDICAL GROUP | Facility: PHYSICIAN GROUP | Age: 41
End: 2021-04-05

## 2021-04-05 NOTE — TELEPHONE ENCOUNTER
Phone Number Called: [phone] (home) [phone] (work)      Call outcome: Left detailed message for patient. Informed to call back with any additional questions.    Message: Called to inform patient that cholesterol is slightly elevated and if he wants to discuss this to please make an appointment with Dr. Merida

## 2021-06-23 ENCOUNTER — OFFICE VISIT (OUTPATIENT)
Dept: MEDICAL GROUP | Facility: PHYSICIAN GROUP | Age: 41
End: 2021-06-23
Payer: COMMERCIAL

## 2021-06-23 VITALS
SYSTOLIC BLOOD PRESSURE: 120 MMHG | HEIGHT: 69 IN | HEART RATE: 78 BPM | DIASTOLIC BLOOD PRESSURE: 76 MMHG | WEIGHT: 246.3 LBS | RESPIRATION RATE: 12 BRPM | BODY MASS INDEX: 36.48 KG/M2 | OXYGEN SATURATION: 96 % | TEMPERATURE: 98.6 F

## 2021-06-23 DIAGNOSIS — M79.675 TOE PAIN, LEFT: ICD-10-CM

## 2021-06-23 DIAGNOSIS — L08.9 SKIN INFECTION: ICD-10-CM

## 2021-06-23 PROCEDURE — 99213 OFFICE O/P EST LOW 20 MIN: CPT | Performed by: NURSE PRACTITIONER

## 2021-06-23 NOTE — ASSESSMENT & PLAN NOTE
Patient reports left toe pain/injury.  Stubbed his toe on child's toy for 5 days ago.  Initially had severe pain.  Seem to ease up over the next couple days.  In the last few days pain has not been getting better.  Mild swelling.  Denies decreased sensation or coolness.

## 2021-06-23 NOTE — PROGRESS NOTES
"CC: Toe injury/pain    HISTORY OF THE PRESENT ILLNESS: Patient is a 40 y.o. male. This pleasant patient is here today for evaluation and management of the following health problems.        Toe pain, left  Patient reports left toe pain/injury.  Stubbed his toe on child's toy for 5 days ago.  Initially had severe pain.  Seem to ease up over the next couple days.  In the last few days pain has not been getting better.  Mild swelling.  Denies decreased sensation or coolness.    Skin infection  Patient reports he scraped his left anterior shin on piece of metal at work.  Has been cleaning it with hydrogen peroxide and applying Neosporin ointment.  Surrounded by mild erythema.  Denies drainage, fever.      Allergies: Patient has no known allergies.    Current Outpatient Medications Ordered in Epic   Medication Sig Dispense Refill   • mupirocin (BACTROBAN) 2 % Ointment Apply 1 Application topically 2 times a day. 22 g 0     No current Epic-ordered facility-administered medications on file.       History reviewed. No pertinent past medical history.    Past Surgical History:   Procedure Laterality Date   • VASECTOMY         Social History     Tobacco Use   • Smoking status: Never Smoker   • Smokeless tobacco: Never Used   Substance Use Topics   • Alcohol use: No   • Drug use: No       History reviewed. No pertinent family history.    ROS:   As in HPI, otherwise negative for chest pain, dyspnea, abdominal pain, dysuria, blood in stool, fever           Exam: /76 (BP Location: Right arm, Patient Position: Sitting, BP Cuff Size: Adult long)   Pulse 78   Temp 37 °C (98.6 °F) (Temporal)   Resp 12   Ht 1.753 m (5' 9\")   Wt 112 kg (246 lb 4.8 oz)   SpO2 96%  Body mass index is 36.37 kg/m².    General: Alert, well nourished, well developed male in NAD  Neck: Supple  Pulmonary:  Normal effort.   Neurologic: Grossly nonfocal  Left lower extremity: Anterior shin with 2 cm crusting with 1 cm surrounding erythema, dry.  Left " fifth toe with mild edema and small serous blister at nailbed, sensation intact, full active range of motion, pedal pulses palpable.  Skin: Warm and dry.    Musculoskeletal: Normal gait.   Psych: Normal mood and affect. Alert and oriented. Judgment and insight is normal.    Please note that this dictation was created using voice recognition software. I have made every reasonable attempt to correct obvious errors, but I expect that there are errors of grammar and possibly content that I did not discover before finalizing the note.      Assessment/Plan  1. Toe pain, left  We will get foot x-ray.  Did advise patient that if it does come back as a fracture, that treatment will be to continue with wearing stiff soled shoes.  Did advise on ice 3 times a day.    - DX-FOOT-COMPLETE 3+ LEFT; Future    2. Skin infection  Mild infection.  Will apply mupirocin twice daily.  Patient will return to clinic or urgent care if wound becomes purulent or erythema expands.  - mupirocin (BACTROBAN) 2 % Ointment; Apply 1 Application topically 2 times a day.  Dispense: 22 g; Refill: 0

## 2021-06-23 NOTE — ASSESSMENT & PLAN NOTE
Patient reports he scraped his left anterior shin on piece of metal at work.  Has been cleaning it with hydrogen peroxide and applying Neosporin ointment.  Surrounded by mild erythema.  Denies drainage, fever.

## 2021-06-24 ENCOUNTER — APPOINTMENT (OUTPATIENT)
Dept: RADIOLOGY | Facility: IMAGING CENTER | Age: 41
End: 2021-06-24
Attending: NURSE PRACTITIONER
Payer: COMMERCIAL

## 2021-06-24 ENCOUNTER — APPOINTMENT (OUTPATIENT)
Dept: URGENT CARE | Facility: PHYSICIAN GROUP | Age: 41
End: 2021-06-24
Payer: COMMERCIAL

## 2021-06-24 DIAGNOSIS — M79.675 TOE PAIN, LEFT: ICD-10-CM

## 2021-06-24 PROCEDURE — 73630 X-RAY EXAM OF FOOT: CPT | Mod: TC,FY,LT | Performed by: NURSE PRACTITIONER

## 2021-06-28 ENCOUNTER — PATIENT MESSAGE (OUTPATIENT)
Dept: MEDICAL GROUP | Facility: PHYSICIAN GROUP | Age: 41
End: 2021-06-28

## 2021-06-28 DIAGNOSIS — L08.9 SKIN INFECTION: ICD-10-CM

## 2021-06-29 RX ORDER — SULFAMETHOXAZOLE AND TRIMETHOPRIM 800; 160 MG/1; MG/1
1 TABLET ORAL 2 TIMES DAILY
Qty: 14 TABLET | Refills: 0 | Status: SHIPPED | OUTPATIENT
Start: 2021-06-29 | End: 2022-03-11

## 2021-12-25 ENCOUNTER — HOSPITAL ENCOUNTER (OUTPATIENT)
Facility: MEDICAL CENTER | Age: 41
End: 2021-12-25
Attending: PHYSICIAN ASSISTANT
Payer: COMMERCIAL

## 2021-12-25 ENCOUNTER — OFFICE VISIT (OUTPATIENT)
Dept: URGENT CARE | Facility: PHYSICIAN GROUP | Age: 41
End: 2021-12-25
Payer: COMMERCIAL

## 2021-12-25 VITALS
WEIGHT: 256 LBS | HEIGHT: 69 IN | BODY MASS INDEX: 37.92 KG/M2 | RESPIRATION RATE: 16 BRPM | OXYGEN SATURATION: 95 % | HEART RATE: 92 BPM | DIASTOLIC BLOOD PRESSURE: 80 MMHG | SYSTOLIC BLOOD PRESSURE: 110 MMHG | TEMPERATURE: 98.1 F

## 2021-12-25 DIAGNOSIS — J02.9 SORE THROAT: ICD-10-CM

## 2021-12-25 DIAGNOSIS — J06.9 UPPER RESPIRATORY TRACT INFECTION, UNSPECIFIED TYPE: ICD-10-CM

## 2021-12-25 DIAGNOSIS — J02.0 STREP PHARYNGITIS: ICD-10-CM

## 2021-12-25 DIAGNOSIS — R05.9 COUGH: ICD-10-CM

## 2021-12-25 LAB
INT CON NEG: NEGATIVE
INT CON POS: POSITIVE
S PYO AG THROAT QL: POSITIVE

## 2021-12-25 PROCEDURE — 87880 STREP A ASSAY W/OPTIC: CPT | Performed by: PHYSICIAN ASSISTANT

## 2021-12-25 PROCEDURE — U0003 INFECTIOUS AGENT DETECTION BY NUCLEIC ACID (DNA OR RNA); SEVERE ACUTE RESPIRATORY SYNDROME CORONAVIRUS 2 (SARS-COV-2) (CORONAVIRUS DISEASE [COVID-19]), AMPLIFIED PROBE TECHNIQUE, MAKING USE OF HIGH THROUGHPUT TECHNOLOGIES AS DESCRIBED BY CMS-2020-01-R: HCPCS

## 2021-12-25 PROCEDURE — 99213 OFFICE O/P EST LOW 20 MIN: CPT | Mod: CS | Performed by: PHYSICIAN ASSISTANT

## 2021-12-25 RX ORDER — DEXTROMETHORPHAN HYDROBROMIDE AND PROMETHAZINE HYDROCHLORIDE 15; 6.25 MG/5ML; MG/5ML
5 SYRUP ORAL EVERY 4 HOURS PRN
Qty: 120 ML | Refills: 0 | Status: SHIPPED | OUTPATIENT
Start: 2021-12-25 | End: 2022-03-11

## 2021-12-25 RX ORDER — AMOXICILLIN 875 MG/1
875 TABLET, COATED ORAL 2 TIMES DAILY
Qty: 20 TABLET | Refills: 0 | Status: SHIPPED | OUTPATIENT
Start: 2021-12-25 | End: 2022-01-04

## 2021-12-25 NOTE — PROGRESS NOTES
Chief Complaint   Patient presents with   • Cough     3- 4 day   • Pharyngitis     scratchy throat x 4 days   • Fever     only had fever 1 day       HISTORY OF PRESENT ILLNESS: Patient is a 41 y.o. male who presents today because he has a 3 to 4-day history of cough, sore throat, had fever on day 1 or 2.  He has not been taking any medications for his current symptoms other than Tylenol.  Denies any nausea, vomiting or diarrhea.  He is vaccinated for Covid.    Patient Active Problem List    Diagnosis Date Noted   • Toe pain, left 06/23/2021   • Skin infection 06/23/2021   • Anxiety 03/12/2021   • Keratosis pilaris 03/12/2021   • Gas pain 11/10/2020   • Obesity (BMI 35.0-39.9 without comorbidity) ([facility]) 11/10/2020   • Bilateral knee pain 11/14/2018   • Obesity due to excess calories without serious comorbidity 11/14/2018   • Dyslipidemia 11/14/2018       Allergies:Patient has no known allergies.    Current Outpatient Medications Ordered in Epic   Medication Sig Dispense Refill   • promethazine-dextromethorphan (PROMETHAZINE-DM) 6.25-15 MG/5ML syrup Take 5 mL by mouth every four hours as needed for Cough. 120 mL 0   • diphenhydrAMINE-lidocaine-Maalox (MBX) oral susp cup Take 5 mL by mouth every 6 hours as needed. 90 mL 0   • amoxicillin (AMOXIL) 875 MG tablet Take 1 Tablet by mouth 2 times a day for 10 days. 20 Tablet 0   • sulfamethoxazole-trimethoprim (BACTRIM DS) 800-160 MG tablet Take 1 tablet by mouth 2 times a day. 14 tablet 0   • mupirocin (BACTROBAN) 2 % Ointment Apply 1 Application topically 2 times a day. 22 g 0     Current Facility-Administered Medications Ordered in Epic   Medication Dose Route Frequency Provider Last Rate Last Admin   • cefTRIAXone (ROCEPHIN) 500 mg, lidocaine (XYLOCAINE) 1 % 1.8 mL for IM use  500 mg Intramuscular Once Elio Johnston P.A.-C.           History reviewed. No pertinent past medical history.    Social History     Tobacco Use   • Smoking status: Never Smoker   • Smokeless  "tobacco: Never Used   Substance Use Topics   • Alcohol use: Yes     Comment: very rare   • Drug use: No       No family status information on file.   History reviewed. No pertinent family history.    ROS:  Review of Systems   Constitutional: Positive for 1 day of resolved fever, no current chills, weight loss and malaise/fatigue.   HENT: Negative for ear pain, nosebleeds, positive for congestion, sore throat and no neck pain.    Eyes: Negative for blurred vision.   Respiratory: Positive for cough, minimal sputum production, no shortness of breath and wheezing.    Cardiovascular: Negative for chest pain, palpitations, orthopnea and leg swelling.   Gastrointestinal: Negative for heartburn, nausea, vomiting and abdominal pain.   Genitourinary: Negative for dysuria, urgency and frequency.     Exam:  /80   Pulse 92   Temp 36.7 °C (98.1 °F) (Temporal)   Resp 16   Ht 1.753 m (5' 9\")   Wt 116 kg (256 lb)   SpO2 95%   General:  Well nourished, well developed male in NAD  Head:Normocephalic, atraumatic  Eyes: PERRLA, EOM within normal limits, no conjunctival injection, no scleral icterus, visual fields and acuity grossly intact.  Ears: Normal shape and symmetry, no tenderness, no discharge. External canals are without any significant edema or erythema. Tympanic membranes are without any inflammation, no effusion. Gross auditory acuity is intact  Nose: Symmetrical without tenderness, no discharge.  Mouth: reasonable hygiene, no erythema exudates or tonsillar enlargement.  Neck: no masses, range of motion within normal limits, no tracheal deviation. No obvious thyroid enlargement.  Pulmonary: chest is symmetrical with respiration, no wheezes, crackles, or rhonchi.  Cardiovascular: regular rate and rhythm without murmurs, rubs, or gallops.  Extremities: no clubbing, cyanosis, or edema.    Strep test is positive    Please note that this dictation was created using voice recognition software. I have made every " reasonable attempt to correct obvious errors, but I expect that there are errors of grammar and possibly content that I did not discover before finalizing the note.    Assessment/Plan:  1. Sore throat  POCT Rapid Strep A    diphenhydrAMINE-lidocaine-Maalox (MBX) oral susp cup   2. Upper respiratory tract infection, unspecified type  SARS-CoV-2 PCR (24 hour In-House): Collect NP swab in VTM   3. Cough  promethazine-dextromethorphan (PROMETHAZINE-DM) 6.25-15 MG/5ML syrup   4. Strep pharyngitis  amoxicillin (AMOXIL) 875 MG tablet    cefTRIAXone (ROCEPHIN) 500 mg, lidocaine (XYLOCAINE) 1 % 1.8 mL for IM use   Discussed over-the-counter symptomatically, strict isolation until Covid test returns.  No pharmacies are open on [place] [facility] today, will administer 500 Rocephin in the office, prescription written for amoxicillin    Followup with primary care in the next 7-10 days if not significantly improving, return to the urgent care or go to the emergency room sooner for any worsening of symptoms.

## 2021-12-25 NOTE — PATIENT INSTRUCTIONS

## 2021-12-27 DIAGNOSIS — J06.9 UPPER RESPIRATORY TRACT INFECTION, UNSPECIFIED TYPE: ICD-10-CM

## 2021-12-27 LAB — COVID ORDER STATUS COVID19: NORMAL

## 2021-12-28 LAB
SARS-COV-2 RNA RESP QL NAA+PROBE: NOTDETECTED
SPECIMEN SOURCE: NORMAL

## 2022-01-26 ENCOUNTER — OFFICE VISIT (OUTPATIENT)
Dept: URGENT CARE | Facility: PHYSICIAN GROUP | Age: 42
End: 2022-01-26
Payer: COMMERCIAL

## 2022-01-26 ENCOUNTER — HOSPITAL ENCOUNTER (OUTPATIENT)
Facility: MEDICAL CENTER | Age: 42
End: 2022-01-26
Attending: NURSE PRACTITIONER
Payer: COMMERCIAL

## 2022-01-26 VITALS
HEIGHT: 69 IN | HEART RATE: 98 BPM | DIASTOLIC BLOOD PRESSURE: 72 MMHG | BODY MASS INDEX: 37.47 KG/M2 | SYSTOLIC BLOOD PRESSURE: 118 MMHG | WEIGHT: 253 LBS | TEMPERATURE: 97.8 F | OXYGEN SATURATION: 97 % | RESPIRATION RATE: 16 BRPM

## 2022-01-26 DIAGNOSIS — U07.1 COVID: ICD-10-CM

## 2022-01-26 PROCEDURE — U0005 INFEC AGEN DETEC AMPLI PROBE: HCPCS

## 2022-01-26 PROCEDURE — 99213 OFFICE O/P EST LOW 20 MIN: CPT | Mod: CS | Performed by: NURSE PRACTITIONER

## 2022-01-26 PROCEDURE — U0003 INFECTIOUS AGENT DETECTION BY NUCLEIC ACID (DNA OR RNA); SEVERE ACUTE RESPIRATORY SYNDROME CORONAVIRUS 2 (SARS-COV-2) (CORONAVIRUS DISEASE [COVID-19]), AMPLIFIED PROBE TECHNIQUE, MAKING USE OF HIGH THROUGHPUT TECHNOLOGIES AS DESCRIBED BY CMS-2020-01-R: HCPCS

## 2022-01-26 ASSESSMENT — ENCOUNTER SYMPTOMS
SPUTUM PRODUCTION: 0
CHILLS: 0
WHEEZING: 0
PALPITATIONS: 0
ORTHOPNEA: 0
MYALGIAS: 1
FEVER: 0
SORE THROAT: 1
HEMOPTYSIS: 0
COUGH: 1
SHORTNESS OF BREATH: 0

## 2022-01-26 NOTE — PROGRESS NOTES
"Subjective     John Domínguez is a 41 y.o. male who presents with Runny Nose (test pso for strep at last visit, lingering cough ) and Cough (sunday-monday started )            John comes in today with a 3 day history of cough with runny nose and sore throat.  He had strep throat last month but took antibiotics with good resolution of pharyngitis until this week with new onset of symptoms.  No chest pain or shortness of breath.        Review of Systems   Constitutional: Positive for malaise/fatigue. Negative for chills and fever.   HENT: Positive for congestion and sore throat. Negative for ear pain.    Respiratory: Positive for cough. Negative for hemoptysis, sputum production, shortness of breath and wheezing.    Cardiovascular: Negative for chest pain, palpitations, orthopnea and leg swelling.   Musculoskeletal: Positive for myalgias.     Medications, Allergies, and current problem list reviewed today in Epic         Objective     Blood Pressure 118/72 (BP Location: Right arm, Patient Position: Sitting, BP Cuff Size: Adult)   Pulse 98   Temperature 36.6 °C (97.8 °F) (Temporal)   Respiration 16   Height 1.753 m (5' 9\")   Weight 115 kg (253 lb)   Oxygen Saturation 97%   Body Mass Index 37.36 kg/m²      Physical Exam  Vitals reviewed.   Constitutional:       General: He is not in acute distress.     Appearance: He is well-developed. He is not diaphoretic.   HENT:      Right Ear: Tympanic membrane, ear canal and external ear normal. There is no impacted cerumen.      Left Ear: Tympanic membrane, ear canal and external ear normal. There is no impacted cerumen.      Nose: Congestion and rhinorrhea present.      Mouth/Throat:      Mouth: Mucous membranes are moist.      Pharynx: No oropharyngeal exudate or posterior oropharyngeal erythema.   Eyes:      General: No scleral icterus.        Right eye: No discharge.         Left eye: No discharge.      Conjunctiva/sclera: Conjunctivae normal.      Pupils: Pupils are " equal, round, and reactive to light.   Neck:      Thyroid: No thyromegaly.      Vascular: No JVD.      Trachea: No tracheal deviation.   Cardiovascular:      Rate and Rhythm: Normal rate and regular rhythm.      Heart sounds: Normal heart sounds. No murmur heard.  No friction rub. No gallop.    Pulmonary:      Effort: Pulmonary effort is normal. No respiratory distress.      Breath sounds: Normal breath sounds. No stridor. No wheezing, rhonchi or rales.   Chest:      Chest wall: No tenderness.   Musculoskeletal:      Cervical back: Neck supple.   Lymphadenopathy:      Cervical: No cervical adenopathy.   Skin:     General: Skin is warm and dry.      Coloration: Skin is not pale.      Findings: No erythema.   Neurological:      Mental Status: He is alert and oriented to person, place, and time.   Psychiatric:         Mood and Affect: Mood normal.                             Assessment & Plan        1. URI with cough and congestion  - SARS-CoV-2, PCR (In-House); Future    Advised John that based on the history and exam findings, this is likely a self-limiting viral illness.  There is no indication for antibiotics at this time.  Differential reviewed.  At home isolation recommended pending covid test results.  OTC cold medications prn symptom management.  OTC NSAIDs or tylenol prn fever, pain.  Maintain adequate po hydration.  RTC in 5-7 days if symptoms persist, sooner if worse.  ED precautions reviewed.  He verbalized understanding of and agreed with plan of care.    Addendum :Notified John of positive covid result via [employer].  1. COVID  SARS-CoV-2, PCR (In-House)

## 2022-01-27 DIAGNOSIS — J06.9 URI WITH COUGH AND CONGESTION: ICD-10-CM

## 2022-01-27 LAB — COVID ORDER STATUS COVID19: NORMAL

## 2022-01-28 LAB
SARS-COV-2 RNA RESP QL NAA+PROBE: DETECTED
SPECIMEN SOURCE: ABNORMAL

## 2022-03-10 ENCOUNTER — HOSPITAL ENCOUNTER (OUTPATIENT)
Dept: LAB | Facility: MEDICAL CENTER | Age: 42
End: 2022-03-10
Attending: NURSE PRACTITIONER
Payer: COMMERCIAL

## 2022-03-10 DIAGNOSIS — Z13.6 SCREENING FOR CARDIOVASCULAR CONDITION: ICD-10-CM

## 2022-03-10 DIAGNOSIS — Z12.5 SCREENING FOR PROSTATE CANCER: ICD-10-CM

## 2022-03-10 LAB
ALBUMIN SERPL BCP-MCNC: 4.9 G/DL (ref 3.2–4.9)
ALBUMIN/GLOB SERPL: 1.9 G/DL
ALP SERPL-CCNC: 46 U/L (ref 30–99)
ALT SERPL-CCNC: 21 U/L (ref 2–50)
ANION GAP SERPL CALC-SCNC: 10 MMOL/L (ref 7–16)
AST SERPL-CCNC: 19 U/L (ref 12–45)
BILIRUB SERPL-MCNC: 0.5 MG/DL (ref 0.1–1.5)
BUN SERPL-MCNC: 18 MG/DL (ref 8–22)
CALCIUM SERPL-MCNC: 9.1 MG/DL (ref 8.5–10.5)
CHLORIDE SERPL-SCNC: 102 MMOL/L (ref 96–112)
CHOLEST SERPL-MCNC: 241 MG/DL (ref 100–199)
CO2 SERPL-SCNC: 26 MMOL/L (ref 20–33)
CREAT SERPL-MCNC: 1.09 MG/DL (ref 0.5–1.4)
ERYTHROCYTE [DISTWIDTH] IN BLOOD BY AUTOMATED COUNT: 42.8 FL (ref 35.9–50)
FASTING STATUS PATIENT QL REPORTED: NORMAL
GLOBULIN SER CALC-MCNC: 2.6 G/DL (ref 1.9–3.5)
GLUCOSE SERPL-MCNC: 79 MG/DL (ref 65–99)
HCT VFR BLD AUTO: 49.5 % (ref 42–52)
HDLC SERPL-MCNC: 53 MG/DL
HGB BLD-MCNC: 16.3 G/DL (ref 14–18)
LDLC SERPL CALC-MCNC: 169 MG/DL
MCH RBC QN AUTO: 31 PG (ref 27–33)
MCHC RBC AUTO-ENTMCNC: 32.9 G/DL (ref 33.7–35.3)
MCV RBC AUTO: 94.1 FL (ref 81.4–97.8)
PLATELET # BLD AUTO: 185 K/UL (ref 164–446)
PMV BLD AUTO: 10.8 FL (ref 9–12.9)
POTASSIUM SERPL-SCNC: 4.9 MMOL/L (ref 3.6–5.5)
PROT SERPL-MCNC: 7.5 G/DL (ref 6–8.2)
PSA SERPL-MCNC: 0.77 NG/ML (ref 0–4)
RBC # BLD AUTO: 5.26 M/UL (ref 4.7–6.1)
SODIUM SERPL-SCNC: 138 MMOL/L (ref 135–145)
TRIGL SERPL-MCNC: 95 MG/DL (ref 0–149)
WBC # BLD AUTO: 5.2 K/UL (ref 4.8–10.8)

## 2022-03-10 PROCEDURE — 36415 COLL VENOUS BLD VENIPUNCTURE: CPT

## 2022-03-10 PROCEDURE — 84153 ASSAY OF PSA TOTAL: CPT

## 2022-03-10 PROCEDURE — 80061 LIPID PANEL: CPT

## 2022-03-10 PROCEDURE — 85027 COMPLETE CBC AUTOMATED: CPT

## 2022-03-10 PROCEDURE — 80053 COMPREHEN METABOLIC PANEL: CPT

## 2022-03-11 ENCOUNTER — OFFICE VISIT (OUTPATIENT)
Dept: MEDICAL GROUP | Facility: PHYSICIAN GROUP | Age: 42
End: 2022-03-11
Payer: COMMERCIAL

## 2022-03-11 VITALS
DIASTOLIC BLOOD PRESSURE: 76 MMHG | WEIGHT: 247 LBS | TEMPERATURE: 97.1 F | HEIGHT: 69 IN | RESPIRATION RATE: 12 BRPM | OXYGEN SATURATION: 99 % | SYSTOLIC BLOOD PRESSURE: 120 MMHG | HEART RATE: 62 BPM | BODY MASS INDEX: 36.58 KG/M2

## 2022-03-11 DIAGNOSIS — Z12.5 SCREENING FOR PROSTATE CANCER: ICD-10-CM

## 2022-03-11 DIAGNOSIS — M54.50 ACUTE BILATERAL LOW BACK PAIN WITHOUT SCIATICA: ICD-10-CM

## 2022-03-11 DIAGNOSIS — G89.29 CHRONIC PAIN OF BOTH KNEES: ICD-10-CM

## 2022-03-11 DIAGNOSIS — M25.561 CHRONIC PAIN OF BOTH KNEES: ICD-10-CM

## 2022-03-11 DIAGNOSIS — E78.5 DYSLIPIDEMIA: ICD-10-CM

## 2022-03-11 DIAGNOSIS — D22.9 ATYPICAL NEVI: ICD-10-CM

## 2022-03-11 DIAGNOSIS — M25.562 CHRONIC PAIN OF BOTH KNEES: ICD-10-CM

## 2022-03-11 DIAGNOSIS — F41.9 ANXIETY: ICD-10-CM

## 2022-03-11 DIAGNOSIS — Z13.6 SCREENING FOR CARDIOVASCULAR CONDITION: ICD-10-CM

## 2022-03-11 PROBLEM — M79.675 TOE PAIN, LEFT: Status: RESOLVED | Noted: 2021-06-23 | Resolved: 2022-03-11

## 2022-03-11 PROBLEM — L08.9 SKIN INFECTION: Status: RESOLVED | Noted: 2021-06-23 | Resolved: 2022-03-11

## 2022-03-11 PROCEDURE — 99213 OFFICE O/P EST LOW 20 MIN: CPT | Performed by: NURSE PRACTITIONER

## 2022-03-11 ASSESSMENT — PATIENT HEALTH QUESTIONNAIRE - PHQ9
CLINICAL INTERPRETATION OF PHQ2 SCORE: 1
5. POOR APPETITE OR OVEREATING: 0 - NOT AT ALL
SUM OF ALL RESPONSES TO PHQ QUESTIONS 1-9: 1

## 2022-03-11 ASSESSMENT — FIBROSIS 4 INDEX: FIB4 SCORE: 0.92

## 2022-03-11 NOTE — ASSESSMENT & PLAN NOTE
Patient reports intermittent low back pain.  Will flareup after working on a project while bending over a lot.  Currently working on building play area for his son.  Was really bad 3 days ago.  Improving.  Pain is in low back, no leg pain.  Aggravated by flexion.  Has not tried ice or anti-inflammatory.

## 2022-03-11 NOTE — PROGRESS NOTES
"CC: Lab review    HISTORY OF THE PRESENT ILLNESS: Patient is a 41 y.o. male. This pleasant patient is here today for evaluation and management of the following health problems.        Anxiety  Chronic problem.  Now established with counselor here in town.  Has gone to 3 or 4 sessions.  Thinks it is helping.  [facility].  He never heard back from [facility].    Bilateral knee pain  Continues with bilateral chronic knee pain.  Aggravated by going up stairs.  I see in [place] [facility] receiving injections a couple years ago.  Does not like to take medications.  Not using ice.  Working on weight loss.      Dyslipidemia  This is a chronic health problem that is well controlled with current  lifestyle measures.  The 10-year ASCVD risk score ([place]esteban VERONICA Jr., et al., 2013) is: 1.5%  Working on \"[employer] diet.\"  Has been able to lose 13 pounds.    Acute bilateral low back pain without sciatica  Patient reports intermittent low back pain.  Will flareup after working on a project while bending over a lot.  Currently working on building play area for his son.  Was really bad 3 days ago.  Improving.  Pain is in low back, no leg pain.  Aggravated by flexion.  Has not tried ice or anti-inflammatory.      Allergies: Patient has no known allergies.    No current [employer]-ordered outpatient medications on file.     No current [employer]-ordered facility-administered medications on file.       History reviewed. No pertinent past medical history.    Past Surgical History:   Procedure Laterality Date   • VASECTOMY         Social History     Tobacco Use   • Smoking status: Never Smoker   • Smokeless tobacco: Never Used   Substance Use Topics   • Alcohol use: Yes     Comment: very rare   • Drug use: No       History reviewed. No pertinent family history.    ROS: As in HPI, otherwise negative for chest pain, dyspnea, abdominal pain, dysuria, blood in stool, fever             Exam: /76 (BP Location: Left arm, Patient " "Position: Sitting, BP Cuff Size: Adult)   Pulse 62   Temp 36.2 °C (97.1 °F) (Temporal)   Resp 12   Ht 1.753 m (5' 9\")   Wt 112 kg (247 lb)   SpO2 99%  Body mass index is 36.48 kg/m².    General: Alert, pleasant, well nourished, well developed male in NAD  HEENT: Normocephalic. Eyes conjunctiva clear lids without ptosis, pupils equal and reactive to light, ears normal shape and contour, canals are clear bilaterally, tympanic membranes are pearly gray with good light reflex, nasal mucosa without erythema and drainage, oropharynx is without erythema, edema or exudates.   Neck: Supple without bruit. Thyroid is not enlarged.  Pulmonary: Clear to ausculation.  Normal effort. No rales, ronchi, or wheezing.  Cardiovascular: Normal rate and rhythm without murmur. Carotid and radial pulses are intact and equal bilaterally.  No lower extremity edema.  Abdomen: Soft, nontender, nondistended. Normal bowel sounds. Liver and spleen are not palpable  Low back: no erythema or edema, nontender to palpation, able to tiptoe and heel walk, full active ROM, patellar DTR's 1+ and equal bilaterally  Neurologic: Grossly nonfocal  Lymph: No cervical or supraclavicular lymph nodes are palpable  Skin: Warm and dry.    Musculoskeletal: Normal gait.   Psych: Normal mood and affect. Alert and oriented. Judgment and insight is normal.    Component      Latest Ref Rng & Units 3/10/2022   Sodium      135 - 145 mmol/L 138   Potassium      3.6 - 5.5 mmol/L 4.9   Chloride      96 - 112 mmol/L 102   Co2      20 - 33 mmol/L 26   Anion Gap      7.0 - 16.0 10.0   Glucose      65 - 99 mg/dL 79   Bun      8 - 22 mg/dL 18   Creatinine      0.50 - 1.40 mg/dL 1.09   Calcium      8.5 - 10.5 mg/dL 9.1   AST(SGOT)      12 - 45 U/L 19   ALT(SGPT)      2 - 50 U/L 21   Alkaline Phosphatase      30 - 99 U/L 46   Total Bilirubin      0.1 - 1.5 mg/dL 0.5   Albumin      3.2 - 4.9 g/dL 4.9   Total Protein      6.0 - 8.2 g/dL 7.5   Globulin      1.9 - 3.5 g/dL 2.6 "   A-G Ratio      g/dL 1.9   WBC      4.8 - 10.8 K/uL 5.2   RBC      4.70 - 6.10 M/uL 5.26   Hemoglobin      14.0 - 18.0 g/dL 16.3   Hematocrit      42.0 - 52.0 % 49.5   MCV      81.4 - 97.8 fL 94.1   MCH      27.0 - 33.0 pg 31.0   MCHC      33.7 - 35.3 g/dL 32.9 (L)   RDW      35.9 - 50.0 fL 42.8   Platelet Count      164 - 446 K/uL 185   MPV      9.0 - 12.9 fL 10.8   Cholesterol,Tot      100 - 199 mg/dL 241 (H)   Triglycerides      0 - 149 mg/dL 95   HDL      >=40 mg/dL 53   LDL      <100 mg/dL 169 (H)   GFR If African American      >60 mL/min/1.73 m 2 >60   GFR If Non African American      >60 mL/min/1.73 m 2 >60   Prostatic Specific Antigen Tot      0.00 - 4.00 ng/mL 0.77   Fasting Status       Fasting       Please note that this dictation was created using voice recognition software. I have made every reasonable attempt to correct obvious errors, but I expect that there are errors of grammar and possibly content that I did not discover before finalizing the note.      Assessment/Plan  1. Anxiety  Continue with therapy.    2. Chronic pain of both knees  Continue with lifestyle measures.  Declines referral back to orthopedics or physical therapy.    3. Acute bilateral low back pain without sciatica  Chronic intermittent problem.  We will start with x-ray for work-up.  Did advise on ice, NSAIDs with food, gentle stretching, back exercises.  Handout on back exercises given to patient today.  Declines physical therapy.  - DX-LUMBAR SPINE-2 OR 3 VIEWS; Future    4. Atypical nevi  Patient reports concerns over multiple nevi and requesting referral to dermatology.  - Referral to Dermatology    5. Dyslipidemia  Reviewed ASCVD risk with patient.  Congratulated him on his weight loss and continued efforts.  We will continue to monitor.  Did advise to incorporate routine aerobic exercise.  - Lipid Profile; Future    6. Screening for cardiovascular condition  We will get annual labs prior to next appointment in a year  -  ESTIMATED GFR; Future  - Comp Metabolic Panel; Future  - CBC WITH DIFFERENTIAL; Future    7. Screening for prostate cancer    - PROSTATE SPECIFIC AG SCREENING; Future    Patient will return to clinic annually or sooner if needed.

## 2022-03-11 NOTE — ASSESSMENT & PLAN NOTE
"This is a chronic health problem that is well controlled with current  lifestyle measures.  The 10-year ASCVD risk score (Erin VERONICA Jr., et al., 2013) is: 1.5%  Working on \"[employer] diet.\"  Has been able to lose 13 pounds.  "

## 2022-03-11 NOTE — ASSESSMENT & PLAN NOTE
Chronic problem.  Now established with counselor here in town.  Has gone to 3 or 4 sessions.  Thinks it is helping.  [facility]  [facility]

## 2022-03-11 NOTE — ASSESSMENT & PLAN NOTE
Continues with bilateral chronic knee pain.  Aggravated by going up stairs.  I see in [place] [facility] receiving injections a couple years ago.  Does not like to take medications.  Not using ice.  Working on weight loss.

## 2022-04-20 ENCOUNTER — OFFICE VISIT (OUTPATIENT)
Dept: URGENT CARE | Facility: PHYSICIAN GROUP | Age: 42
End: 2022-04-20
Payer: COMMERCIAL

## 2022-04-20 VITALS
TEMPERATURE: 96.8 F | BODY MASS INDEX: 36.58 KG/M2 | OXYGEN SATURATION: 97 % | DIASTOLIC BLOOD PRESSURE: 86 MMHG | WEIGHT: 247 LBS | SYSTOLIC BLOOD PRESSURE: 106 MMHG | HEIGHT: 69 IN | RESPIRATION RATE: 16 BRPM | HEART RATE: 71 BPM

## 2022-04-20 DIAGNOSIS — J06.9 URI WITH COUGH AND CONGESTION: ICD-10-CM

## 2022-04-20 PROCEDURE — 99213 OFFICE O/P EST LOW 20 MIN: CPT | Performed by: NURSE PRACTITIONER

## 2022-04-20 ASSESSMENT — ENCOUNTER SYMPTOMS
NAUSEA: 0
SORE THROAT: 1
COUGH: 1
HEMOPTYSIS: 0
WHEEZING: 0
SHORTNESS OF BREATH: 0
VOMITING: 0
FEVER: 0
SPUTUM PRODUCTION: 1
DIARRHEA: 0
HEADACHES: 1

## 2022-04-20 ASSESSMENT — FIBROSIS 4 INDEX: FIB4 SCORE: 0.92

## 2022-04-20 NOTE — PATIENT INSTRUCTIONS
Symptomatic care.  -Oral hydration and rest.   -Cough control: nonpharmacologic options for cough relief such as throat lozenges, hot tea, honey.  -Over the counter expectorant as directed; Guaifenesin (Mucinex).  -Tylenol or ibuprofen for pain and fever as directed.   -Sudafed for sinuses  -You may try saline irrigation or neti pot.   -Warm compress to sinuses.      Follow up with primary care provider. Urgently for worsening symptoms, persistent fevers, facial swelling, visual changes, stiff neck,prolonged cough, persistent wheezing.  sinus symptoms last longer than 10 days, or any other concerns. Seek emergency medical care immediately for: Trouble breathing, persistent pain or pressure in the chest, confusion, inability to wake or stay awake, bluish lips or face, persistent tachycardia (fast heart rate), prolonged dizziness, persistent high grade fevers.     Upper Respiratory Infection, Adult  An upper respiratory infection (URI) is a common viral infection of the nose, throat, and upper air passages that lead to the lungs. The most common type of URI is the common cold. URIs usually get better on their own, without medical treatment.  What are the causes?  A URI is caused by a virus. You may catch a virus by:  · Breathing in droplets from an infected person's cough or sneeze.  · Touching something that has been exposed to the virus (contaminated) and then touching your mouth, nose, or eyes.  What increases the risk?  You are more likely to get a URI if:  · You are very young or very old.  · It is eloisa or winter.  · You have close contact with others, such as at a , school, or health care facility.  · You smoke.  · You have long-term (chronic) heart or lung disease.  · You have a weakened disease-fighting (immune) system.  · You have nasal allergies or asthma.  · You are experiencing a lot of stress.  · You work in an area that has poor air circulation.  · You have poor nutrition.  What are the signs or  symptoms?  A URI usually involves some of the following symptoms:  · Runny or stuffy (congested) nose.  · Sneezing.  · Cough.  · Sore throat.  · Headache.  · Fatigue.  · Fever.  · Loss of appetite.  · Pain in your forehead, behind your eyes, and over your cheekbones (sinus pain).  · Muscle aches.  · Redness or irritation of the eyes.  · Pressure in the ears or face.  How is this diagnosed?  This condition may be diagnosed based on your medical history and symptoms, and a physical exam. Your health care provider may use a cotton swab to take a mucus sample from your nose (nasal swab). This sample can be tested to determine what virus is causing the illness.  How is this treated?  URIs usually get better on their own within 7-10 days. You can take steps at home to relieve your symptoms. Medicines cannot cure URIs, but your health care provider may recommend certain medicines to help relieve symptoms, such as:  · Over-the-counter cold medicines.  · Cough suppressants. Coughing is a type of defense against infection that helps to clear the respiratory system, so take these medicines only as recommended by your health care provider.  · Fever-reducing medicines.  Follow these instructions at home:  Activity  · Rest as needed.  · If you have a fever, stay home from work or school until your fever is gone or until your health care provider says you are no longer contagious. Your health care provider may have you wear a face mask to prevent your infection from spreading.  Relieving symptoms  · Gargle with a salt-water mixture 3-4 times a day or as needed. To make a salt-water mixture, completely dissolve ½-1 tsp of salt in 1 cup of warm water.  · Use a cool-mist humidifier to add moisture to the air. This can help you breathe more easily.  Eating and drinking    · Drink enough fluid to keep your urine pale yellow.  · Eat soups and other clear broths.  General instructions    · Take over-the-counter and prescription medicines  only as told by your health care provider. These include cold medicines, fever reducers, and cough suppressants.  · Do not use any products that contain nicotine or tobacco, such as cigarettes and e-cigarettes. If you need help quitting, ask your health care provider.  · Stay away from secondhand smoke.  · Stay up to date on all immunizations, including the yearly (annual) flu vaccine.  · Keep all follow-up visits as told by your health care provider. This is important.  How to prevent the spread of infection to others    · URIs can be passed from person to person (are contagious). To prevent the infection from spreading:  ? Wash your hands often with soap and water. If soap and water are not available, use hand .  ? Avoid touching your mouth, face, eyes, or nose.  ? Cough or sneeze into a tissue or your sleeve or elbow instead of into your hand or into the air.  Contact a health care provider if:  · You are getting worse instead of better.  · You have a fever or chills.  · Your mucus is brown or red.  · You have yellow or brown discharge coming from your nose.  · You have pain in your face, especially when you bend forward.  · You have swollen neck glands.  · You have pain while swallowing.  · You have white areas in the back of your throat.  Get help right away if:  · You have shortness of breath that gets worse.  · You have severe or persistent:  ? Headache.  ? Ear pain.  ? Sinus pain.  ? Chest pain.  · You have chronic lung disease along with any of the following:  ? Wheezing.  ? Prolonged cough.  ? Coughing up blood.  ? A change in your usual mucus.  · You have a stiff neck.  · You have changes in your:  ? Vision.  ? Hearing.  ? Thinking.  ? Mood.  Summary  · An upper respiratory infection (URI) is a common infection of the nose, throat, and upper air passages that lead to the lungs.  · A URI is caused by a virus.  · URIs usually get better on their own within 7-10 days.  · Medicines cannot cure URIs,  but your health care provider may recommend certain medicines to help relieve symptoms.  This information is not intended to replace advice given to you by your health care provider. Make sure you discuss any questions you have with your health care provider.  Document Released: 06/13/2002 Document Revised: 12/26/2019 Document Reviewed: 08/03/2018  Elsevier Patient Education © 2020 [employer]

## 2022-04-20 NOTE — PROGRESS NOTES
Subjective:     John Domínguez is a 41 y.o. male who presents for Nasal Congestion (X3days ), Cough, and Pharyngitis      Started on Monday. Having sinus pressure. Taking Nyquil. Hx of COVID x 2, last in January.     Cough  This is a new problem. The current episode started in the past 7 days. The cough is productive of sputum. Associated symptoms include headaches, nasal congestion and a sore throat. Pertinent negatives include no ear pain, fever, hemoptysis, shortness of breath or wheezing. Nothing aggravates the symptoms. His past medical history is significant for environmental allergies.   Pharyngitis   Associated symptoms include congestion, coughing and headaches. Pertinent negatives include no diarrhea, ear pain, shortness of breath or vomiting.       No past medical history on file.    Past Surgical History:   Procedure Laterality Date   • VASECTOMY         Social History     Socioeconomic History   • Marital status:      Spouse name: Not on file   • Number of children: Not on file   • Years of education: Not on file   • Highest education level: Not on file   Occupational History   • Not on file   Tobacco Use   • Smoking status: Never Smoker   • Smokeless tobacco: Never Used   Substance and Sexual Activity   • Alcohol use: Yes     Comment: very rare   • Drug use: No   • Sexual activity: Never   Other Topics Concern   • Not on file   Social History Narrative   • Not on file     Social Determinants of Health     Financial Resource Strain: Not on file   Food Insecurity: Not on file   Transportation Needs: Not on file   Physical Activity: Not on file   Stress: Not on file   Social Connections: Not on file   Intimate Partner Violence: Not on file   Housing Stability: Not on file        No family history on file.     No Known Allergies    Review of Systems   Constitutional: Negative for fever.   HENT: Positive for congestion and sore throat. Negative for ear pain.    Respiratory: Positive for cough and  "sputum production. Negative for hemoptysis, shortness of breath and wheezing.    Gastrointestinal: Negative for diarrhea, nausea and vomiting.   Neurological: Positive for headaches.   Endo/Heme/Allergies: Positive for environmental allergies.   All other systems reviewed and are negative.       Objective:   /86   Pulse 71   Temp 36 °C (96.8 °F) (Temporal)   Resp 16   Ht 1.753 m (5' 9\")   Wt 112 kg (247 lb)   SpO2 97%   BMI 36.48 kg/m²     Physical Exam  Vitals reviewed.   Constitutional:       General: He is not in acute distress.     Appearance: He is well-developed. He is not toxic-appearing.   HENT:      Head: Normocephalic and atraumatic.      Right Ear: Tympanic membrane, ear canal and external ear normal. Tympanic membrane is not erythematous.      Left Ear: Tympanic membrane, ear canal and external ear normal. Tympanic membrane is not erythematous.      Nose: Congestion present.      Right Sinus: No maxillary sinus tenderness or frontal sinus tenderness.      Left Sinus: No maxillary sinus tenderness or frontal sinus tenderness.      Mouth/Throat:      Lips: Pink.      Mouth: Mucous membranes are moist.      Pharynx: Uvula midline. Posterior oropharyngeal erythema present.      Tonsils: No tonsillar exudate.   Eyes:      Conjunctiva/sclera: Conjunctivae normal.   Cardiovascular:      Rate and Rhythm: Normal rate.   Pulmonary:      Effort: Pulmonary effort is normal. No respiratory distress.      Breath sounds: No stridor. No wheezing, rhonchi or rales.   Musculoskeletal:         General: Normal range of motion.      Cervical back: Normal range of motion and neck supple.   Skin:     General: Skin is warm and dry.      Findings: No rash.   Neurological:      General: No focal deficit present.      Mental Status: He is alert and oriented to person, place, and time.      GCS: GCS eye subscore is 4. GCS verbal subscore is 5. GCS motor subscore is 6.   Psychiatric:         Mood and Affect: Mood " normal.         Speech: Speech normal.         Behavior: Behavior normal.         Thought Content: Thought content normal.         Judgment: Judgment normal.         Assessment/Plan:   1. URI with cough and congestion  Symptomatic care.  -Oral hydration and rest.   -Cough control: nonpharmacologic options for cough relief such as throat lozenges, hot tea, honey.  -Over the counter expectorant as directed; Guaifenesin (Mucinex).  -Tylenol or ibuprofen for pain and fever as directed.   -Sudafed for sinuses  -You may try saline irrigation or neti pot.   -Warm compress to sinuses.      Follow up with primary care provider. Urgently for worsening symptoms, persistent fevers, facial swelling, visual changes, stiff neck,prolonged cough, persistent wheezing.  sinus symptoms last longer than 10 days, or any other concerns. Seek emergency medical care immediately for: Trouble breathing, persistent pain or pressure in the chest, confusion, inability to wake or stay awake, bluish lips or face, persistent tachycardia (fast heart rate), prolonged dizziness, persistent high grade fevers.     -Discussed viral etiology. Recommend COVID testing, patient opted for home test.. Stable Vitals.    Differential diagnosis, natural history, supportive care, and indications for immediate follow-up discussed.

## 2022-07-06 ENCOUNTER — APPOINTMENT (RX ONLY)
Dept: URBAN - NONMETROPOLITAN AREA CLINIC 15 | Facility: CLINIC | Age: 42
Setting detail: DERMATOLOGY
End: 2022-07-06

## 2022-07-06 DIAGNOSIS — L81.4 OTHER MELANIN HYPERPIGMENTATION: ICD-10-CM

## 2022-07-06 DIAGNOSIS — D18.0 HEMANGIOMA: ICD-10-CM

## 2022-07-06 DIAGNOSIS — D22 MELANOCYTIC NEVI: ICD-10-CM

## 2022-07-06 DIAGNOSIS — L82.1 OTHER SEBORRHEIC KERATOSIS: ICD-10-CM

## 2022-07-06 PROBLEM — D22.72 MELANOCYTIC NEVI OF LEFT LOWER LIMB, INCLUDING HIP: Status: ACTIVE | Noted: 2022-07-06

## 2022-07-06 PROBLEM — D22.71 MELANOCYTIC NEVI OF RIGHT LOWER LIMB, INCLUDING HIP: Status: ACTIVE | Noted: 2022-07-06

## 2022-07-06 PROBLEM — D22.5 MELANOCYTIC NEVI OF TRUNK: Status: ACTIVE | Noted: 2022-07-06

## 2022-07-06 PROBLEM — D22.61 MELANOCYTIC NEVI OF RIGHT UPPER LIMB, INCLUDING SHOULDER: Status: ACTIVE | Noted: 2022-07-06

## 2022-07-06 PROBLEM — D22.62 MELANOCYTIC NEVI OF LEFT UPPER LIMB, INCLUDING SHOULDER: Status: ACTIVE | Noted: 2022-07-06

## 2022-07-06 PROBLEM — D23.71 OTHER BENIGN NEOPLASM OF SKIN OF RIGHT LOWER LIMB, INCLUDING HIP: Status: ACTIVE | Noted: 2022-07-06

## 2022-07-06 PROBLEM — D18.01 HEMANGIOMA OF SKIN AND SUBCUTANEOUS TISSUE: Status: ACTIVE | Noted: 2022-07-06

## 2022-07-06 PROBLEM — D23.4 OTHER BENIGN NEOPLASM OF SKIN OF SCALP AND NECK: Status: ACTIVE | Noted: 2022-07-06

## 2022-07-06 PROCEDURE — ? COUNSELING

## 2022-07-06 PROCEDURE — 99203 OFFICE O/P NEW LOW 30 MIN: CPT

## 2022-07-06 PROCEDURE — ? LIQUID NITROGEN

## 2022-07-06 ASSESSMENT — LOCATION DETAILED DESCRIPTION DERM
LOCATION DETAILED: RIGHT INFERIOR FOREHEAD
LOCATION DETAILED: LEFT PROXIMAL CALF
LOCATION DETAILED: RIGHT MEDIAL ZYGOMA
LOCATION DETAILED: RIGHT DISTAL POSTERIOR UPPER ARM
LOCATION DETAILED: LEFT INFERIOR CENTRAL MALAR CHEEK
LOCATION DETAILED: RIGHT LATERAL SUPERIOR CHEST
LOCATION DETAILED: RIGHT PROXIMAL DORSAL FOREARM
LOCATION DETAILED: LEFT DISTAL POSTERIOR THIGH
LOCATION DETAILED: RIGHT PROXIMAL CALF
LOCATION DETAILED: LEFT CENTRAL TEMPLE
LOCATION DETAILED: LEFT LATERAL EYEBROW
LOCATION DETAILED: RIGHT VENTRAL DISTAL FOREARM
LOCATION DETAILED: RIGHT INFERIOR LATERAL LOWER BACK
LOCATION DETAILED: LEFT POSTERIOR SHOULDER
LOCATION DETAILED: RIGHT DISTAL POSTERIOR THIGH
LOCATION DETAILED: LEFT LATERAL SUPERIOR CHEST
LOCATION DETAILED: RIGHT VENTRAL PROXIMAL FOREARM
LOCATION DETAILED: LEFT MEDIAL SUPERIOR CHEST
LOCATION DETAILED: LEFT PROXIMAL DORSAL FOREARM
LOCATION DETAILED: RIGHT ANTERIOR DISTAL THIGH
LOCATION DETAILED: LEFT SUPERIOR LATERAL UPPER BACK
LOCATION DETAILED: LEFT ANTERIOR DISTAL THIGH
LOCATION DETAILED: LEFT VENTRAL DISTAL FOREARM
LOCATION DETAILED: RIGHT MEDIAL INFERIOR CHEST
LOCATION DETAILED: LEFT PROXIMAL POSTERIOR UPPER ARM
LOCATION DETAILED: LEFT VENTRAL PROXIMAL FOREARM
LOCATION DETAILED: RIGHT POSTERIOR SHOULDER

## 2022-07-06 ASSESSMENT — LOCATION ZONE DERM
LOCATION ZONE: FACE
LOCATION ZONE: LEG
LOCATION ZONE: TRUNK
LOCATION ZONE: ARM

## 2022-07-06 ASSESSMENT — LOCATION SIMPLE DESCRIPTION DERM
LOCATION SIMPLE: LEFT UPPER BACK
LOCATION SIMPLE: RIGHT FOREHEAD
LOCATION SIMPLE: LEFT TEMPLE
LOCATION SIMPLE: LEFT SHOULDER
LOCATION SIMPLE: LEFT CHEEK
LOCATION SIMPLE: RIGHT UPPER ARM
LOCATION SIMPLE: RIGHT LOWER BACK
LOCATION SIMPLE: RIGHT CALF
LOCATION SIMPLE: RIGHT THIGH
LOCATION SIMPLE: LEFT THIGH
LOCATION SIMPLE: LEFT CALF
LOCATION SIMPLE: RIGHT FOREARM
LOCATION SIMPLE: RIGHT POSTERIOR THIGH
LOCATION SIMPLE: LEFT POSTERIOR THIGH
LOCATION SIMPLE: CHEST
LOCATION SIMPLE: LEFT EYEBROW
LOCATION SIMPLE: RIGHT ZYGOMA
LOCATION SIMPLE: LEFT UPPER ARM
LOCATION SIMPLE: RIGHT SHOULDER
LOCATION SIMPLE: LEFT FOREARM

## 2022-07-06 NOTE — PROCEDURE: LIQUID NITROGEN
Detail Level: Detailed
Consent: The patient's consent was obtained including but not limited to risks of crusting, scabbing, blistering, scarring, darker or lighter pigmentary change, recurrence, incomplete removal and infection.
Add 52 Modifier (Optional): no
Medical Necessity Information: It is in your best interest to select a reason for this procedure from the list below. All of these items fulfill various CMS LCD requirements except the new and changing color options.
Medical Necessity Clause: This procedure was medically necessary because the lesions that were treated were:  If lesion does not resolve, bx is needed.
Duration Of Freeze Thaw-Cycle (Seconds): 0
Show Spray Paint Technique Variable?: Yes
Post-Care Instructions: I reviewed with the patient in detail post-care instructions. Patient is to wear sunprotection, and avoid picking at any of the treated lesions. Pt may apply Vaseline to crusted or scabbing areas.
Spray Paint Text: The liquid nitrogen was applied to the skin utilizing a spray paint frosting technique.

## 2022-07-30 ENCOUNTER — OFFICE VISIT (OUTPATIENT)
Dept: URGENT CARE | Facility: PHYSICIAN GROUP | Age: 42
End: 2022-07-30
Payer: COMMERCIAL

## 2022-07-30 VITALS
HEART RATE: 82 BPM | OXYGEN SATURATION: 98 % | HEIGHT: 69 IN | TEMPERATURE: 98.4 F | WEIGHT: 235.6 LBS | BODY MASS INDEX: 34.9 KG/M2 | RESPIRATION RATE: 16 BRPM

## 2022-07-30 DIAGNOSIS — Z20.7 EXPOSURE TO SCABIES: ICD-10-CM

## 2022-07-30 DIAGNOSIS — R21 RASH: ICD-10-CM

## 2022-07-30 PROCEDURE — 99213 OFFICE O/P EST LOW 20 MIN: CPT | Performed by: FAMILY MEDICINE

## 2022-07-30 RX ORDER — PERMETHRIN 50 MG/G
CREAM TOPICAL
Qty: 60 G | Refills: 0 | Status: SHIPPED | OUTPATIENT
Start: 2022-07-30 | End: 2022-10-11

## 2022-07-30 RX ORDER — AMOXICILLIN 500 MG/1
CAPSULE ORAL
COMMUNITY
Start: 2022-05-02 | End: 2022-07-30

## 2022-07-30 ASSESSMENT — FIBROSIS 4 INDEX: FIB4 SCORE: 0.92

## 2022-07-30 NOTE — PROGRESS NOTES
"Chief Complaint:    Chief Complaint   Patient presents with   • Rash     Was exposed to scabies 1 week ago       History of Present Illness:    Rash on left arm for few days. Close contact in [place] recently diagnosed with scabies.      Past Medical History:    History reviewed. No pertinent past medical history.    Past Surgical History:    Past Surgical History:   Procedure Laterality Date   • VASECTOMY       Social History:    Social History     Socioeconomic History   • Marital status:      Spouse name: Not on file   • Number of children: Not on file   • Years of education: Not on file   • Highest education level: Not on file   Occupational History   • Not on file   Tobacco Use   • Smoking status: Never Smoker   • Smokeless tobacco: Never Used   Substance and Sexual Activity   • Alcohol use: Yes     Comment: very rare   • Drug use: No   • Sexual activity: Never   Other Topics Concern   • Not on file   Social History Narrative   • Not on file     Social Determinants of Health     Financial Resource Strain: Not on file   Food Insecurity: Not on file   Transportation Needs: Not on file   Physical Activity: Not on file   Stress: Not on file   Social Connections: Not on file   Intimate Partner Violence: Not on file   Housing Stability: Not on file     Family History:    History reviewed. No pertinent family history.    Medications:    No current outpatient medications on file prior to visit.     No current facility-administered medications on file prior to visit.     Allergies:    No Known Allergies      Vitals:    Vitals:    07/30/22 1055   Pulse: 82   Resp: 16   Temp: 36.9 °C (98.4 °F)   TempSrc: Temporal   SpO2: 98%   Weight: 107 kg (235 lb 9.6 oz)   Height: 1.753 m (5' 9\")       Physical Exam:    Constitutional: Vital signs reviewed. Appears well-developed and well-nourished. No acute distress.   Eyes: Sclera white, conjunctivae clear.   ENT: External ears normal. Hearing normal.   Neck: Neck supple. "   Pulmonary/Chest: Respirations non-labored.  Musculoskeletal: Normal gait. Normal range of motion. No muscular atrophy or weakness.  Neurological: Alert and oriented to person, place, and time. Muscle tone normal. Coordination normal.   Skin: Left upper arm, near elbow: small size erythematous rash.  Psychiatric: Normal mood and affect. Behavior is normal. Judgment and thought content normal.       Medical Decision Makin. Exposure to scabies  - permethrin (ELIMITE) 5 % Cream; APPLY CREAM FROM HEAD (AVOID MOUTH/NOSE/EYES) TO SOLES OF FEET AND WASH AFTER 8-14 HOURS.  Dispense: 60 g; Refill: 0    2. Rash  - permethrin (ELIMITE) 5 % Cream; APPLY CREAM FROM HEAD (AVOID MOUTH/NOSE/EYES) TO SOLES OF FEET AND WASH AFTER 8-14 HOURS.  Dispense: 60 g; Refill: 0      Discussed with him DDX, management options, and risks, benefits, and alternatives to treatment plan agreed upon.    Rash on left arm for few days. Close contact in [place] recently diagnosed with scabies.    Left upper arm, near elbow: small size erythematous rash.    Rash on left arm is non-specific.    As topical treatment for Scabies is generally benign, offered to treat for this possibility with Elimite cream. He is agreeable.    Agreeable to medication prescribed.    May also try OTC Hydrocortisone cream to rash as needed.    Discussed expected course of duration, time for improvement, and to seek follow-up in Emergency Room, urgent care, or with PCP if getting worse at any time or not improving within expected time frame.

## 2022-10-11 ENCOUNTER — OFFICE VISIT (OUTPATIENT)
Dept: MEDICAL GROUP | Facility: PHYSICIAN GROUP | Age: 42
End: 2022-10-11
Payer: COMMERCIAL

## 2022-10-11 VITALS
SYSTOLIC BLOOD PRESSURE: 122 MMHG | OXYGEN SATURATION: 97 % | HEART RATE: 89 BPM | RESPIRATION RATE: 16 BRPM | BODY MASS INDEX: 35.77 KG/M2 | WEIGHT: 241.5 LBS | HEIGHT: 69 IN | TEMPERATURE: 97.7 F | DIASTOLIC BLOOD PRESSURE: 78 MMHG

## 2022-10-11 DIAGNOSIS — M54.50 ACUTE BILATERAL LOW BACK PAIN WITHOUT SCIATICA: ICD-10-CM

## 2022-10-11 DIAGNOSIS — E66.9 OBESITY (BMI 30-39.9): ICD-10-CM

## 2022-10-11 DIAGNOSIS — R59.9 ENLARGED LYMPH NODE: ICD-10-CM

## 2022-10-11 DIAGNOSIS — R93.5 ABNORMAL CT OF THE ABDOMEN: ICD-10-CM

## 2022-10-11 DIAGNOSIS — Z23 NEED FOR VACCINATION: ICD-10-CM

## 2022-10-11 PROCEDURE — 90686 IIV4 VACC NO PRSV 0.5 ML IM: CPT | Performed by: NURSE PRACTITIONER

## 2022-10-11 PROCEDURE — 90471 IMMUNIZATION ADMIN: CPT | Performed by: NURSE PRACTITIONER

## 2022-10-11 PROCEDURE — 99214 OFFICE O/P EST MOD 30 MIN: CPT | Mod: 25 | Performed by: NURSE PRACTITIONER

## 2022-10-11 RX ORDER — PREDNISONE 20 MG/1
20 TABLET ORAL DAILY
COMMUNITY
End: 2022-12-16

## 2022-10-11 RX ORDER — GABAPENTIN 100 MG/1
CAPSULE ORAL
Qty: 60 CAPSULE | Refills: 0 | Status: SHIPPED | OUTPATIENT
Start: 2022-10-11 | End: 2022-12-16

## 2022-10-11 RX ORDER — OMEPRAZOLE 40 MG/1
40 CAPSULE, DELAYED RELEASE ORAL DAILY
COMMUNITY
End: 2022-12-16

## 2022-10-11 ASSESSMENT — FIBROSIS 4 INDEX: FIB4 SCORE: 0.94

## 2022-10-11 NOTE — LETTER
[facility]  IBRAHIMA Brian  [address]  [facility] [address]  Fax: [phone]   Authorization for Release/Disclosure of   Protected Health Information   Name: ADELAIDA MCCABE : 1980 SSN: [id number]   Address: [facility] [address]  [facility] [address] Phone:    [phone] (home) [phone] (work)   I authorize the entity listed below to release/disclose the PHI below to:   [facility]/IBRAHIMA Brian and IBRAHIMA Brian   Provider [facility] Name[facility]  [facility]    Address   City, [facility], [address] Phone:  [phone]    Fax:     Reason for request: continuity of care   Information to be released:    [  ] LAST COLONOSCOPY,  including any PATH REPORT and follow-up  [  ] LAST FIT/COLOGUARD RESULT [  ] LAST DEXA  [  ] LAST MAMMOGRAM  [  ] LAST PAP  [  ] LAST LABS [  ] RETINA EXAM REPORT  [  ] IMMUNIZATION RECORDS  [XX  ] Release all info on CT of abdomen       [  ] Check here and initial the line next to each item to release ALL health information INCLUDING  _____ Care and treatment for drug and / or alcohol abuse  _____ HIV testing, infection status, or AIDS  _____ Genetic Testing    DATES OF SERVICE OR TIME PERIOD TO BE DISCLOSED: _____________  I understand and acknowledge that:  * This Authorization may be revoked at any time by you in writing, except if your health information has already been used or disclosed.  * Your health information that will be used or disclosed as a result of you signing this authorization could be re-disclosed by the recipient. If this occurs, your re-disclosed health information may no longer be protected by State or Federal laws.  * You may refuse to sign this Authorization. Your refusal will not affect your ability to obtain treatment.  * This Authorization becomes effective upon signing and will  on (date) __________.      If no date is indicated, this Authorization will  one (1) year from the signature date.     Name: John Domínguez    Signature:   Date:     10/11/2022       PLEASE FAX REQUESTED RECORDS BACK TO: [phone]

## 2022-10-11 NOTE — ASSESSMENT & PLAN NOTE
Patient reports 1-1/2-week onset of low back pain radiating down right thigh medial aspect to knee.  Started after a long road trip.  Does report when he takes long road trips will often get a cramping type feeling in his right thigh, resolves with ambulation.  However, this time it did not resolve and back pain present.  [facility]  Notes not available.  CT abdomen done.  Reportedly kidney stone and UTI ruled out.  CT showed degenerative changes.  Determined to be lumbar etiology.  Was prescribed prednisone 20 mg twice a day for 5 days and omeprazole.  Since starting medication, patient reports that pain has greatly improved, though still present.  He wonders about going to a chiropractor.  Has had intermittent right back pain for a couple years.  Has not seen a physical therapist.  Did not have lumbar x-ray done that was ordered 6 months ago for acute low back pain.  Pain is aggravated by flexion.  Has tried icy hot without relief.  Had chiropractor over 4 years ago that seemed to help with back pain.  Denies saddle paresthesia, foot drop, fever, fecal or urinary incontinence.    Patient does bring in after visit summary [facility] which discusses incidental finding of increase in number and size of mesenteric lymph nodes and recommendation to repeat abdominal CT in 3 months.  Please see additional notes.

## 2022-10-12 NOTE — ASSESSMENT & PLAN NOTE
Patient does bring in after [facility] summary [facility] which discusses incidental finding of increase in number and size of mesenteric lymph nodes and recommendation to repeat abdominal CT in 3 months.  Denies abdominal pain, trouble with urination, fever, night sweats.

## 2022-10-12 NOTE — PROGRESS NOTES
CC: Back pain, incidental finding on CT    HISTORY OF THE PRESENT ILLNESS: Patient is a 42 y.o. male. This pleasant patient is here today for evaluation and management of the following health problems.    Health Maintenance: Due      Acute bilateral low back pain without sciatica  Patient reports 1-1/2-week onset of low back pain radiating down right thigh medial aspect to knee.  Started after a long road trip.  Does report when he takes long road trips will often get a cramping type feeling in his right thigh, resolves with ambulation.  However, this time it did not resolve and back pain present.  [facility].  Notes not available.  CT abdomen done.  Reportedly kidney stone and UTI ruled out.  CT showed degenerative changes.  Determined to be lumbar etiology.  Was prescribed prednisone 20 mg twice a day for 5 days and omeprazole.  Since starting medication, patient reports that pain has greatly improved, though still present.  He wonders about going to a chiropractor.  Has had intermittent right back pain for a couple years.  Has not seen a physical therapist.  Did not have lumbar x-ray done that was ordered 6 months ago for acute low back pain.  Pain is aggravated by flexion.  Has tried icy hot without relief.  Had chiropractor over 4 years ago that seemed to help with back pain.  Denies saddle paresthesia, foot drop, fever, fecal or urinary incontinence.    Patient does bring in [facility] summary [facility] which discusses incidental finding of increase in number and size of mesenteric lymph nodes and recommendation to repeat abdominal CT in 3 months.  Please see additional notes.      Abnormal CT of the abdomen  Patient does bring in after visit summary [facility] which discusses incidental finding of increase in number and size of mesenteric lymph nodes and recommendation to repeat abdominal CT in 3 months.  Denies abdominal pain, trouble with urination, fever, night sweats.    Allergies:  "Patient has no known allergies.    Current Outpatient Medications Ordered in Epic   Medication Sig Dispense Refill    predniSONE (DELTASONE) 20 MG Tab Take 20 mg by mouth every day.      omeprazole (PRILOSEC) 40 MG delayed-release capsule Take 40 mg by mouth every day.      gabapentin (NEURONTIN) 100 MG Cap Take one capsule at bedtime for one week, then increase to one capsule twice a day if needed 60 Capsule 0     No current Epic-ordered facility-administered medications on file.       No past medical history on file.    Past Surgical History:   Procedure Laterality Date    VASECTOMY         Social History     Tobacco Use    Smoking status: Never    Smokeless tobacco: Never   Substance Use Topics    Alcohol use: Yes     Comment: very rare    Drug use: No       No family history on file.    ROS: As in HPI, otherwise negative for chest pain, dyspnea, abdominal pain, dysuria, blood in stool, fever         Exam: /78 (BP Location: Left arm, Patient Position: Sitting, BP Cuff Size: Adult)   Pulse 89   Temp 36.5 °C (97.7 °F) (Temporal)   Resp 16   Ht 1.753 m (5' 9\")   Wt 110 kg (241 lb 8 oz)   SpO2 97%  Body mass index is 35.66 kg/m².    General: Alert, pleasant, well nourished, well developed male in NAD  Neck: Supple without bruit.   Pulmonary: Clear to ausculation.  Normal effort. No rales, ronchi, or wheezing.  Cardiovascular: Normal rate and rhythm without murmur.   Abdomen: Soft, nontender, nondistended. Normal bowel sounds. Liver and spleen are not palpable  Neurologic: Grossly nonfocal  Low back: no erythema or edema, nontender to palpation, able to tiptoe and heel walk, full active ROM, patellar DTR's 1+ and equal bilaterally  Skin: Warm and dry.    Musculoskeletal: Normal gait.   Psych: Normal mood and affect. Alert and oriented. Judgment and insight is normal.    Please note that this dictation was created using voice recognition software. I have made every reasonable attempt to correct obvious " errors, but I expect that there are errors of grammar and possibly content that I did not discover before finalizing the note.      Assessment/Plan  1. Need for vaccination  Given  - INFLUENZA VACCINE QUAD INJ (PF)    2. Acute bilateral low back pain without sciatica  Gradually improving with prednisone.  No red flags.  Advised on physical therapy.  Patient declines at this time and would prefer to try chiropractor first.  He will let me know if he would like referral to physical therapy.  Will order MRI.  Discussed with patient that this may not be covered by insurance until he has physical therapy.  Patient verbalizes understanding.  Will trial gabapentin.  Instructions and side effects reviewed with patient.    - gabapentin (NEURONTIN) 100 MG Cap; Take one capsule at bedtime for one week, then increase to one capsule twice a day if needed  Dispense: 60 Capsule; Refill: 0  - MR-LUMBAR SPINE-W/O; Future    3. Abnormal CT of the abdomen  Incidental finding of increase in number and size of mesenteric lymph nodes.  We will get repeat CT as recommended in 3 months.  [facility]  - CT-ABDOMEN W/O; Future    4. Enlarged lymph node  As in #3  - CT-ABDOMEN W/O; Future    5. Obesity (BMI 30-39.9)    - Patient identified as having weight management issue.  Appropriate orders and counseling given.     Patient will return to clinic in 2 months or sooner if needed for follow-up on back pain.    Addendum 10/12/2022: Received message from patient that he would like to try physical therapy after all.  Requesting referral go to [facility].  Order for PT has been placed.

## 2022-11-08 ENCOUNTER — HOSPITAL ENCOUNTER (OUTPATIENT)
Dept: RADIOLOGY | Facility: MEDICAL CENTER | Age: 42
End: 2022-11-08
Attending: NURSE PRACTITIONER
Payer: COMMERCIAL

## 2022-11-08 ENCOUNTER — HOSPITAL ENCOUNTER (OUTPATIENT)
Dept: RADIOLOGY | Facility: MEDICAL CENTER | Age: 42
End: 2022-11-08

## 2022-11-08 DIAGNOSIS — R93.5 ABNORMAL CT OF THE ABDOMEN: ICD-10-CM

## 2022-11-08 DIAGNOSIS — M54.50 ACUTE BILATERAL LOW BACK PAIN WITHOUT SCIATICA: ICD-10-CM

## 2022-11-08 DIAGNOSIS — R59.9 ENLARGED LYMPH NODE: ICD-10-CM

## 2022-11-08 PROCEDURE — 74150 CT ABDOMEN W/O CONTRAST: CPT

## 2022-11-08 PROCEDURE — 72148 MRI LUMBAR SPINE W/O DYE: CPT

## 2022-12-16 ENCOUNTER — OFFICE VISIT (OUTPATIENT)
Dept: MEDICAL GROUP | Facility: PHYSICIAN GROUP | Age: 42
End: 2022-12-16
Payer: COMMERCIAL

## 2022-12-16 VITALS
DIASTOLIC BLOOD PRESSURE: 70 MMHG | SYSTOLIC BLOOD PRESSURE: 120 MMHG | HEART RATE: 70 BPM | BODY MASS INDEX: 37.47 KG/M2 | WEIGHT: 253 LBS | HEIGHT: 69 IN | OXYGEN SATURATION: 96 % | TEMPERATURE: 97.4 F | RESPIRATION RATE: 16 BRPM

## 2022-12-16 DIAGNOSIS — R19.8 ALTERNATING CONSTIPATION AND DIARRHEA: ICD-10-CM

## 2022-12-16 DIAGNOSIS — R93.5 ABNORMAL ABDOMINAL CT SCAN: ICD-10-CM

## 2022-12-16 DIAGNOSIS — G43.909 MIGRAINE WITHOUT STATUS MIGRAINOSUS, NOT INTRACTABLE, UNSPECIFIED MIGRAINE TYPE: ICD-10-CM

## 2022-12-16 DIAGNOSIS — R10.13 EPIGASTRIC ABDOMINAL PAIN: ICD-10-CM

## 2022-12-16 DIAGNOSIS — M54.50 ACUTE BILATERAL LOW BACK PAIN WITHOUT SCIATICA: ICD-10-CM

## 2022-12-16 PROBLEM — E66.09 OBESITY DUE TO EXCESS CALORIES WITHOUT SERIOUS COMORBIDITY: Status: RESOLVED | Noted: 2018-11-14 | Resolved: 2022-12-16

## 2022-12-16 PROBLEM — E66.9 OBESITY (BMI 30-39.9): Status: RESOLVED | Noted: 2022-10-11 | Resolved: 2022-12-16

## 2022-12-16 PROCEDURE — 99214 OFFICE O/P EST MOD 30 MIN: CPT | Performed by: NURSE PRACTITIONER

## 2022-12-16 RX ORDER — SUMATRIPTAN 50 MG/1
50 TABLET, FILM COATED ORAL
Qty: 10 TABLET | Refills: 3 | Status: SHIPPED | OUTPATIENT
Start: 2022-12-16 | End: 2023-08-12

## 2022-12-16 ASSESSMENT — FIBROSIS 4 INDEX: FIB4 SCORE: 0.94

## 2022-12-17 NOTE — PATIENT INSTRUCTIONS
For migraine prevention:    Magnesium 500 mg once a day    CoQ-10 300 mg once a day    Riboflavin (Vitamin B-2) 400 mg once a day    If migraine occurs:   Take sumatriptan as needed for migraine    Referred to GI
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
## 2022-12-17 NOTE — ASSESSMENT & PLAN NOTE
Patient had repeat abdominal CT which showed resolving mesenteric lymph nodes.  Previous finding of increase in number and size of mesenteric lymph nodes was likely reactive.  Does show possibility of chronic enteritis/ileitis.  Patient does report chronic intermittent diarrhea and constipation.  Wonders if he has irritable bowel.  His mother has Crohn's disease.  Denies abdominal pain, blood in stool, dark black stool.    11/8/2022 4:42 PM     HISTORY/REASON FOR EXAM:  [facility] shows increase in number and size of mesenteric lymph nodes, recommend repeat CT in 3 mo.     TECHNIQUE/EXAM DESCRIPTION:  CT scan of the abdomen without contrast.     Noncontrast helical sections were obtained from the diaphragmatic domes through the iliac crests     Low dose optimization technique was utilized for this CT exam including automated exposure control and adjustment of the mA and/or kV according to patient size.     COMPARISON: 10/8/2022 CT from an outside facility     FINDINGS:  Lower Chest: Unremarkable.     Liver: Normal.     Spleen: Unremarkable.     Pancreas: Unremarkable.     Gallbladder: No calcified stones.     Biliary: Nondilated.     Adrenal glands: Normal.     Kidneys: Unremarkable without hydronephrosis.     Bowel: No obstruction or acute inflammation. Some circumferential fat density is seen in the long segmental in the ileum wall. Appendectomy clips.     Lymph nodes: Scattered mesenteric lymph nodes measure up to 7 mm short axis. On comparison representative 9 and 8 mm short axis nodes now measure 7 and 7 mm. Again there is mild increased density in the mesenteric fat with perivascular halo suggested.   There is no retroperitoneal enlarged lymph node. No enlarged portacaval, retrocrural or posterior mediastinal node     Vasculature: Unremarkable.     Peritoneum: Unremarkable without ascites.     Musculoskeletal: No acute or destructive process. Chronic T8 mild anterior wedge compression fracture  and/or congenital slight central depression anteriorly     Small fatty umbilical hernia again seen     IMPRESSION:     Interval improvement in mesenteric lymph nodes suggesting they are reactive. No conglomerate linda mass is seen to suggest malignancy. 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 This is of no acute significance
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
## 2022-12-17 NOTE — ASSESSMENT & PLAN NOTE
Patient reports continued epigastric gas pain.  Occurring daily.  Used to only happen after drinking carbonated beverages or greasy foods.  Now happening more often.

## 2023-02-24 ENCOUNTER — OFFICE VISIT (OUTPATIENT)
Dept: MEDICAL GROUP | Facility: PHYSICIAN GROUP | Age: 43
End: 2023-02-24
Payer: COMMERCIAL

## 2023-02-24 VITALS
DIASTOLIC BLOOD PRESSURE: 80 MMHG | BODY MASS INDEX: 37.78 KG/M2 | RESPIRATION RATE: 16 BRPM | HEIGHT: 69 IN | TEMPERATURE: 96.6 F | OXYGEN SATURATION: 98 % | WEIGHT: 255.1 LBS | SYSTOLIC BLOOD PRESSURE: 112 MMHG | HEART RATE: 74 BPM

## 2023-02-24 DIAGNOSIS — Z13.29 SCREENING FOR THYROID DISORDER: ICD-10-CM

## 2023-02-24 DIAGNOSIS — E78.5 DYSLIPIDEMIA: ICD-10-CM

## 2023-02-24 DIAGNOSIS — Z12.5 SCREENING FOR PROSTATE CANCER: ICD-10-CM

## 2023-02-24 DIAGNOSIS — Z13.6 SCREENING FOR CARDIOVASCULAR CONDITION: ICD-10-CM

## 2023-02-24 DIAGNOSIS — E66.9 OBESITY (BMI 35.0-39.9 WITHOUT COMORBIDITY): ICD-10-CM

## 2023-02-24 DIAGNOSIS — M25.562 CHRONIC PAIN OF BOTH KNEES: ICD-10-CM

## 2023-02-24 DIAGNOSIS — G89.29 CHRONIC PAIN OF BOTH KNEES: ICD-10-CM

## 2023-02-24 DIAGNOSIS — M25.561 CHRONIC PAIN OF BOTH KNEES: ICD-10-CM

## 2023-02-24 PROCEDURE — 99213 OFFICE O/P EST LOW 20 MIN: CPT | Performed by: NURSE PRACTITIONER

## 2023-02-24 ASSESSMENT — PATIENT HEALTH QUESTIONNAIRE - PHQ9: CLINICAL INTERPRETATION OF PHQ2 SCORE: 0

## 2023-02-24 ASSESSMENT — FIBROSIS 4 INDEX: FIB4 SCORE: 0.94

## 2023-02-24 NOTE — ASSESSMENT & PLAN NOTE
Has been struggling with obesity for few years.  Has been gaining weight lately.  Having increased appetite.  Not exercising.  Has comorbid hyperlipidemia and bilateral knee pain.  Was able to lose about 20 pounds a year ago with diet and exercise.  However, patient is having difficulty this time with following through.  Has not tried structured diet program.  Wondering if there is a medication he can take to help with appetite and weight loss.  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

## 2023-02-24 NOTE — PROGRESS NOTES
CC: Weight management    HISTORY OF THE PRESENT ILLNESS: Patient is a 42 y.o. male. This pleasant patient is here today for evaluation and management of the following health problems.        Obesity (BMI 35.0-39.9 without comorbidity) ([facility])  Has been struggling with obesity for few years.  Has been gaining weight lately.  Having increased appetite.  Not exercising.  Has comorbid hyperlipidemia and bilateral knee pain.  Was able to lose about 20 pounds a year ago with diet and exercise.  However, patient is having difficulty this time with following through.  Has not tried structured diet program.  Wondering if there is a medication he can take to help with appetite and weight loss.  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        Bilateral knee pain  Chronic health problem.  Has gotten knee injections in the past.  Tries to manage with lifestyle measures.  Weight is in obese category and likely contributing to pain.    Allergies: Patient has no known allergies.    Current Outpatient Medications Ordered in Epic   Medication Sig Dispense Refill    Semaglutide,0.25 or 0.5MG/DOS, 2 MG/1.5ML Solution Pen-injector Inject 0.25 mg under the skin every 7 days. 1.5 mL 0    SUMAtriptan (IMITREX) 50 MG Tab Take 1 Tablet by mouth one time as needed for Migraine for up to 1 dose. May take a 2nd tab in one hour if still having symptoms.  Not to take more than 2 tabs in 24 hours. 10 Tablet 3     No current Epic-ordered facility-administered medications on file.       History reviewed. No pertinent past medical history.    Past Surgical History:   Procedure Laterality Date    VASECTOMY         Social History     Tobacco Use    Smoking status: Never    Smokeless tobacco: Never   Substance Use Topics    Alcohol use: Yes     Comment: very rare    Drug use: No       Family History   Problem Relation Age of Onset    Other Mother         Crohns       ROS: As in HPI,  "otherwise negative for chest pain, dyspnea, abdominal pain, dysuria, blood in stool, fever         Exam: /80   Pulse 74   Temp 35.9 °C (96.6 °F) (Temporal)   Resp 16   Ht 1.753 m (5' 9\")   Wt 116 kg (255 lb 1.6 oz)   SpO2 98%  Body mass index is 37.67 kg/m².    General: Alert, pleasant, obese habitus, well nourished, well developed male in NAD  HEENT: Normocephalic. Eyes conjunctiva clear lids without ptosis   Neck: Supple without bruit. Thyroid is not enlarged.  Pulmonary: Clear to ausculation.  Normal effort. No rales, ronchi, or wheezing.  Cardiovascular: Normal rate and rhythm without murmur. Abdomen: Soft, nontender, nondistended.   Neurologic: Grossly nonfocal  Lymph: No cervical or supraclavicular lymph nodes are palpable  Skin: Warm and dry.    Musculoskeletal: Normal gait.   Psych: Normal mood and affect. Alert and oriented. Judgment and insight is normal.    Please note that this dictation was created using voice recognition software. I have made every reasonable attempt to correct obvious errors, but I expect that there are errors of grammar and possibly content that I did not discover before finalizing the note.      Assessment/Plan  1. Obesity (BMI 35.0-39.9 without comorbidity) (HCC)  Patient struggling with obesity.  Has comorbidities of bilateral knee pain, dyslipidemia, back pain.  We will treat with Ozempic.  Instructions reviewed with patient.  Consider Mounjaro or referral to medical weight management if medication is not covered.  Advised on structured program such as Weight [employer]chers.  - Semaglutide,0.25 or 0.5MG/DOS, 2 MG/1.5ML Solution Pen-injector; Inject 0.25 mg under the skin every 7 days.  Dispense: 1.5 mL; Refill: 0    2. Chronic pain of both knees  Continue with lifestyle measures.  Advised on weight loss.  - Semaglutide,0.25 or 0.5MG/DOS, 2 MG/1.5ML Solution Pen-injector; Inject 0.25 mg under the skin every 7 days.  Dispense: 1.5 mL; Refill: 0    3. Dyslipidemia  Continue " with low-fat diet.  Advised him weight loss, routine aerobic exercise as tolerated, low-fat diet.  - Semaglutide,0.25 or 0.5MG/DOS, 2 MG/1.5ML Solution Pen-injector; Inject 0.25 mg under the skin every 7 days.  Dispense: 1.5 mL; Refill: 0  - Lipid Profile; Future    4. Screening for prostate cancer    - PROSTATE SPECIFIC AG SCREENING; Future    5. Screening for cardiovascular condition    - Comp Metabolic Panel; Future  - ESTIMATED GFR; Future  - CBC WITHOUT DIFFERENTIAL; Future    6. Screening for thyroid disorder    - TSH WITH REFLEX TO FT4; Future    Patient will return to clinic in 1 month or sooner if needed.   Next Month's Dosage: Continue Current Dosage

## 2023-03-22 ENCOUNTER — TELEPHONE (OUTPATIENT)
Dept: MEDICAL GROUP | Facility: PHYSICIAN GROUP | Age: 43
End: 2023-03-22
Payer: COMMERCIAL

## 2023-03-22 NOTE — TELEPHONE ENCOUNTER
MEDICATION PRIOR AUTHORIZATION NEEDED:    1. Name of Medication: Ozempic    2. Requested By (Name of Pharmacy): [facility]     3. Is insurance on file current? Yes    4. What is the name & phone number of the 3rd party payor? Covermymeds         Key: [id number]

## 2023-03-23 ENCOUNTER — TELEPHONE (OUTPATIENT)
Dept: MEDICAL GROUP | Facility: PHYSICIAN GROUP | Age: 43
End: 2023-03-23
Payer: COMMERCIAL

## 2023-03-23 NOTE — TELEPHONE ENCOUNTER
Patient's insurance denied Ozempic due him not being diabetic. Please advise if you want to change

## 2023-03-27 ENCOUNTER — PATIENT MESSAGE (OUTPATIENT)
Dept: MEDICAL GROUP | Facility: PHYSICIAN GROUP | Age: 43
End: 2023-03-27
Payer: COMMERCIAL

## 2023-03-27 DIAGNOSIS — E78.5 DYSLIPIDEMIA: ICD-10-CM

## 2023-03-27 DIAGNOSIS — E66.9 OBESITY (BMI 35.0-39.9 WITHOUT COMORBIDITY): ICD-10-CM

## 2023-03-28 RX ORDER — DULAGLUTIDE 0.75 MG/.5ML
0.5 INJECTION, SOLUTION SUBCUTANEOUS
Qty: 2 ML | Refills: 1 | Status: SHIPPED | OUTPATIENT
Start: 2023-03-28 | End: 2023-08-12

## 2023-08-12 ENCOUNTER — OFFICE VISIT (OUTPATIENT)
Dept: URGENT CARE | Facility: PHYSICIAN GROUP | Age: 43
End: 2023-08-12
Payer: COMMERCIAL

## 2023-08-12 VITALS — HEIGHT: 69 IN | BODY MASS INDEX: 37.03 KG/M2 | WEIGHT: 250 LBS

## 2023-08-12 DIAGNOSIS — H10.13 ALLERGIC CONJUNCTIVITIS OF BOTH EYES: ICD-10-CM

## 2023-08-12 DIAGNOSIS — J30.2 SEASONAL ALLERGIES: ICD-10-CM

## 2023-08-12 PROCEDURE — 99213 OFFICE O/P EST LOW 20 MIN: CPT | Performed by: FAMILY MEDICINE

## 2023-08-12 RX ORDER — TRIAMCINOLONE ACETONIDE 40 MG/ML
60 INJECTION, SUSPENSION INTRA-ARTICULAR; INTRAMUSCULAR ONCE
Status: COMPLETED | OUTPATIENT
Start: 2023-08-12 | End: 2023-08-12

## 2023-08-12 RX ORDER — OLOPATADINE HYDROCHLORIDE 1 MG/ML
1 SOLUTION/ DROPS OPHTHALMIC 2 TIMES DAILY PRN
Qty: 5 ML | Refills: 5 | Status: SHIPPED | OUTPATIENT
Start: 2023-08-12 | End: 2023-10-30

## 2023-08-12 RX ADMIN — TRIAMCINOLONE ACETONIDE 60 MG: 40 INJECTION, SUSPENSION INTRA-ARTICULAR; INTRAMUSCULAR at 10:17

## 2023-08-12 ASSESSMENT — FIBROSIS 4 INDEX: FIB4 SCORE: 0.94

## 2023-08-12 NOTE — PROGRESS NOTES
"Chief Complaint:    Chief Complaint   Patient presents with    Seasonal Allergies     Eyes are really dry and itchy       History of Present Illness:    X 1 week, eyes are irritated and has been sneezing without purulent mucus from nose. No fever. Refresh eye products and Claritin have not helped.      Past Medical History:    History reviewed. No pertinent past medical history.    Past Surgical History:    Past Surgical History:   Procedure Laterality Date    VASECTOMY       Social History:    Social History     Socioeconomic History    Marital status:      Spouse name: Not on file    Number of children: Not on file    Years of education: Not on file    Highest education level: Not on file   Occupational History    Not on file   Tobacco Use    Smoking status: Never    Smokeless tobacco: Never   Substance and Sexual Activity    Alcohol use: Yes     Comment: very rare    Drug use: No    Sexual activity: Never   Other Topics Concern    Not on file   Social History Narrative    Not on file     Social Determinants of Health     Financial Resource Strain: Not on file   Food Insecurity: Not on file   Transportation Needs: Not on file   Physical Activity: Not on file   Stress: Not on file   Social Connections: Not on file   Intimate Partner Violence: Not on file   Housing Stability: Not on file     Family History:    Family History   Problem Relation Age of Onset    Other Mother         Crohns     Medications:    No current outpatient medications on file prior to visit.     No current facility-administered medications on file prior to visit.     Allergies:    No Known Allergies      Vitals:    Vitals:    08/12/23 0907   BP: (P) 110/64   Pulse: (P) 65   Resp: (P) 16   Temp: (P) 36.4 °C (97.6 °F)   TempSrc: (P) Temporal   SpO2: (P) 93%   Weight: 113 kg (250 lb)   Height: 1.753 m (5' 9\")       Physical Exam:    Constitutional: Vital signs reviewed. Appears well-developed and well-nourished. Sniffling. No acute " distress.   Eyes: Bilateral conjunctivae are mildly-moderately injected. PERRLA.  ENT: External ears normal. Hearing normal.  Neck: Neck supple.   Pulmonary/Chest: Respirations non-labored.   Musculoskeletal: Normal gait. No muscular atrophy or weakness.  Neurological: Alert and oriented to person, place, and time. Muscle tone normal. Coordination normal.   Skin: No rashes or lesions. Warm, dry, normal turgor.  Psychiatric: Normal mood and affect. Behavior is normal. Judgment and thought content normal.       Assessment / Plan & Medical Decision Makin. Seasonal allergies  - triamcinolone acetonide (Kenalog-40) injection 60 mg    2. Allergic conjunctivitis of both eyes  - olopatadine (PATANOL) 0.1 % ophthalmic solution; Administer 1 Drop into both eyes 2 times a day as needed (for dry and itchy eyes).  Dispense: 5 mL; Refill: 5  - triamcinolone acetonide (Kenalog-40) injection 60 mg       Discussed with him DDX, management options, and risks, benefits, and alternatives to treatment plan agreed upon.    X 1 week, eyes are irritated and has been sneezing without purulent mucus from nose. No fever. Refresh eye products and Claritin have not helped.    Sniffling. Bilateral conjunctivae are mildly-moderately injected.    Agreeable to medications given and prescribed.    Discussed expected course of duration, time for improvement, and to seek follow-up in Emergency Room, urgent care, or with PCP if getting worse at any time or not improving within expected time frame.

## 2023-10-30 ENCOUNTER — OFFICE VISIT (OUTPATIENT)
Dept: URGENT CARE | Facility: PHYSICIAN GROUP | Age: 43
End: 2023-10-30
Payer: COMMERCIAL

## 2023-10-30 VITALS
BODY MASS INDEX: 36.29 KG/M2 | WEIGHT: 245 LBS | OXYGEN SATURATION: 96 % | HEIGHT: 69 IN | DIASTOLIC BLOOD PRESSURE: 78 MMHG | SYSTOLIC BLOOD PRESSURE: 112 MMHG | HEART RATE: 71 BPM | RESPIRATION RATE: 14 BRPM | TEMPERATURE: 97 F

## 2023-10-30 DIAGNOSIS — J01.90 ACUTE BACTERIAL SINUSITIS: ICD-10-CM

## 2023-10-30 DIAGNOSIS — B96.89 ACUTE BACTERIAL SINUSITIS: ICD-10-CM

## 2023-10-30 DIAGNOSIS — R05.1 ACUTE COUGH: ICD-10-CM

## 2023-10-30 PROCEDURE — 3074F SYST BP LT 130 MM HG: CPT | Performed by: NURSE PRACTITIONER

## 2023-10-30 PROCEDURE — 3078F DIAST BP <80 MM HG: CPT | Performed by: NURSE PRACTITIONER

## 2023-10-30 PROCEDURE — 99213 OFFICE O/P EST LOW 20 MIN: CPT | Performed by: NURSE PRACTITIONER

## 2023-10-30 RX ORDER — AMOXICILLIN AND CLAVULANATE POTASSIUM 875; 125 MG/1; MG/1
1 TABLET, FILM COATED ORAL 2 TIMES DAILY
Qty: 20 TABLET | Refills: 0 | Status: SHIPPED | OUTPATIENT
Start: 2023-10-30 | End: 2023-11-09

## 2023-10-30 ASSESSMENT — FIBROSIS 4 INDEX: FIB4 SCORE: 0.96

## 2023-10-30 NOTE — PROGRESS NOTES
"Subjective:   John Domínguez is a 43 y.o. male who presents for Cough (2-3 weeks cough, congestion, yellow green mucus, sinus pressure )    Patient is a 43-year-old male presenting clinic today reporting 3-week history of cough with thick yellow and green sputum, nasal congestion with sinus pressure, postnasal drip causing sore throat mainly in the morning, and headache.  He denies having any shortness of breath, wheezing, chest pain, palpitations, headache, fever, or chills.  He did try DayQuil once or twice without any symptom relief.  Denies any known sick contact.    Medications, Allergies, and current problem list reviewed today in Epic.     Objective:     /78   Pulse 71   Temp 36.1 °C (97 °F) (Temporal)   Resp 14   Ht 1.753 m (5' 9\")   Wt 111 kg (245 lb)   SpO2 96%     Physical Exam  Vitals reviewed.   Constitutional:       General: He is not in acute distress.     Appearance: Normal appearance. He is not toxic-appearing.   HENT:      Head: Normocephalic.      Right Ear: Tympanic membrane, ear canal and external ear normal.      Left Ear: Tympanic membrane, ear canal and external ear normal.      Nose: Mucosal edema, congestion and rhinorrhea present.      Right Turbinates: Swollen.      Left Turbinates: Swollen.      Right Sinus: Maxillary sinus tenderness and frontal sinus tenderness present.      Left Sinus: Maxillary sinus tenderness and frontal sinus tenderness present.      Mouth/Throat:      Lips: Pink.      Mouth: Mucous membranes are moist.      Pharynx: Oropharynx is clear. Uvula midline.      Comments: Postnasal drip  Eyes:      Extraocular Movements: Extraocular movements intact.      Conjunctiva/sclera: Conjunctivae normal.      Pupils: Pupils are equal, round, and reactive to light.   Cardiovascular:      Rate and Rhythm: Normal rate and regular rhythm.   Pulmonary:      Effort: Pulmonary effort is normal.      Breath sounds: Normal breath sounds. No wheezing, rhonchi or rales. "   Musculoskeletal:         General: Normal range of motion.      Cervical back: Normal range of motion and neck supple.   Lymphadenopathy:      Cervical: No cervical adenopathy.   Skin:     General: Skin is warm and dry.   Neurological:      General: No focal deficit present.      Mental Status: He is alert and oriented to person, place, and time.   Psychiatric:         Mood and Affect: Mood normal.         Behavior: Behavior normal.         Assessment/Plan:     Diagnosis and associated orders:     1. Acute bacterial sinusitis  amoxicillin-clavulanate (AUGMENTIN) 875-125 MG Tab      2. Acute cough           Comments/MDM:     Provided patient with information on the etiology & pathogenesis of bacterial sinusitis.  Supportive care measures discussed.  Antibiotic prescription sent to pharmacy.  Side effects were discussed.  Lung sounds are clear.  Low suspicion at this time for pneumonia.  Vital signs are all stable.  Recommend cool mist humidifier at home, use nasal saline wash (i.e. Nedi-Pot), may take OTC decongestant prn, and antibiotics as prescribed.   Tylenol/Motrin prn HA or discomfort.   Return to clinic for fever >4d, no improvement within 48-72h, or for any other questions or concerns.            Differential diagnosis, natural history, supportive care, and indications for immediate follow-up discussed.    Advised the patient to follow-up with the primary care physician for recheck, reevaluation, and consideration of further management.    I personally reviewed prior external notes and test results pertinent to today's visit as well as additional imaging and testing completed in clinic today.     Please note that this dictation was created using voice recognition software. I have made a reasonable attempt to correct obvious errors, but I expect that there are errors of grammar and possibly content that I did not discover before finalizing the note.

## 2024-04-12 ENCOUNTER — OFFICE VISIT (OUTPATIENT)
Dept: URGENT CARE | Facility: PHYSICIAN GROUP | Age: 44
End: 2024-04-12
Payer: COMMERCIAL

## 2024-04-12 VITALS
BODY MASS INDEX: 33.37 KG/M2 | OXYGEN SATURATION: 97 % | RESPIRATION RATE: 18 BRPM | WEIGHT: 226 LBS | HEART RATE: 96 BPM | TEMPERATURE: 97.4 F | DIASTOLIC BLOOD PRESSURE: 70 MMHG | SYSTOLIC BLOOD PRESSURE: 110 MMHG

## 2024-04-12 DIAGNOSIS — Z88.9 HISTORY OF SEASONAL ALLERGIES: ICD-10-CM

## 2024-04-12 DIAGNOSIS — J06.9 URI WITH COUGH AND CONGESTION: ICD-10-CM

## 2024-04-12 LAB
FLUAV RNA SPEC QL NAA+PROBE: NEGATIVE
FLUBV RNA SPEC QL NAA+PROBE: NEGATIVE
RSV RNA SPEC QL NAA+PROBE: NEGATIVE
SARS-COV-2 RNA RESP QL NAA+PROBE: NEGATIVE

## 2024-04-12 PROCEDURE — 0241U POCT CEPHEID COV-2, FLU A/B, RSV - PCR: CPT | Performed by: NURSE PRACTITIONER

## 2024-04-12 PROCEDURE — 99213 OFFICE O/P EST LOW 20 MIN: CPT | Performed by: NURSE PRACTITIONER

## 2024-04-12 PROCEDURE — 3074F SYST BP LT 130 MM HG: CPT | Performed by: NURSE PRACTITIONER

## 2024-04-12 PROCEDURE — 3078F DIAST BP <80 MM HG: CPT | Performed by: NURSE PRACTITIONER

## 2024-04-12 RX ORDER — BENZONATATE 100 MG/1
100 CAPSULE ORAL 3 TIMES DAILY PRN
Qty: 30 CAPSULE | Refills: 0 | Status: SHIPPED | OUTPATIENT
Start: 2024-04-12

## 2024-04-12 RX ORDER — FLUTICASONE PROPIONATE 50 MCG
1 SPRAY, SUSPENSION (ML) NASAL DAILY
Qty: 16 G | Refills: 0 | Status: SHIPPED | OUTPATIENT
Start: 2024-04-12

## 2024-04-12 ASSESSMENT — ENCOUNTER SYMPTOMS
FEVER: 0
SPUTUM PRODUCTION: 1
SORE THROAT: 1
HEMOPTYSIS: 0
WHEEZING: 0
SHORTNESS OF BREATH: 0
COUGH: 1

## 2024-04-12 NOTE — PROGRESS NOTES
Subjective:     John Domínguez is a 43 y.o. male who presents for Cough (2 days ago got sore throat, mucous. Yesterday worse, losing voice, it's hoarse. Hard time speaking last night. Coughing up phlegm, runny nose/congestion. Sinus pressure. Did have severe allergies last year with sinus infection. )      Symptoms started 2 days ago. Felt warm this morning, took temp and did not have a fever. States he took Claritin, as he initially thought it was allegies. Sore throat,  3/10.    Cough  This is a new problem. The current episode started yesterday. Associated symptoms include nasal congestion and a sore throat. Pertinent negatives include no fever, hemoptysis, shortness of breath or wheezing. His past medical history is significant for environmental allergies.       History reviewed. No pertinent past medical history.    Past Surgical History:   Procedure Laterality Date    VASECTOMY         Social History     Socioeconomic History    Marital status:      Spouse name: Not on file    Number of children: Not on file    Years of education: Not on file    Highest education level: Not on file   Occupational History    Not on file   Tobacco Use    Smoking status: Never    Smokeless tobacco: Never   Vaping Use    Vaping Use: Never used   Substance and Sexual Activity    Alcohol use: Not Currently     Comment: very rare    Drug use: No    Sexual activity: Never   Other Topics Concern    Not on file   Social History Narrative    Not on file     Social Determinants of Health     Financial Resource Strain: Not on file   Food Insecurity: Not on file   Transportation Needs: Not on file   Physical Activity: Not on file   Stress: Not on file   Social Connections: Not on file   Intimate Partner Violence: Not on file   Housing Stability: Not on file        Family History   Problem Relation Age of Onset    Other Mother         Crohns        No Known Allergies    Review of Systems   Constitutional:  Negative for fever.   HENT:   Positive for congestion and sore throat.    Respiratory:  Positive for cough and sputum production. Negative for hemoptysis, shortness of breath and wheezing.    Endo/Heme/Allergies:  Positive for environmental allergies.   All other systems reviewed and are negative.       Objective:   /70   Pulse 96   Temp 36.3 °C (97.4 °F) (Temporal)   Resp 18   Wt 103 kg (226 lb) Comment: pt states  SpO2 97%   BMI 33.37 kg/m²     Physical Exam  Vitals reviewed.   Constitutional:       General: He is not in acute distress.     Appearance: He is well-developed. He is not toxic-appearing.   HENT:      Head: Normocephalic and atraumatic.      Right Ear: External ear normal. No drainage or swelling. Tympanic membrane is not erythematous.      Left Ear: External ear normal. No drainage or swelling. Tympanic membrane is not erythematous.      Nose: Mucosal edema present.      Mouth/Throat:      Lips: Pink.      Mouth: Mucous membranes are moist.      Pharynx: Posterior oropharyngeal erythema present. No oropharyngeal exudate.   Eyes:      Conjunctiva/sclera: Conjunctivae normal.   Cardiovascular:      Rate and Rhythm: Normal rate.   Pulmonary:      Effort: Pulmonary effort is normal. No respiratory distress.      Breath sounds: Normal breath sounds. No stridor. No wheezing, rhonchi or rales.   Musculoskeletal:      Cervical back: Neck supple.   Skin:     General: Skin is warm and dry.      Findings: No rash.   Neurological:      Mental Status: He is alert and oriented to person, place, and time.      GCS: GCS eye subscore is 4. GCS verbal subscore is 5. GCS motor subscore is 6.   Psychiatric:         Speech: Speech normal.         Behavior: Behavior normal.         Thought Content: Thought content normal.         Judgment: Judgment normal.         Assessment/Plan:   1. URI with cough and congestion  - fluticasone (FLONASE) 50 MCG/ACT nasal spray; Administer 1 Spray into affected nostril(S) every day.  Dispense: 16 g;  Refill: 0  - POCT CoV-2, Flu A/B, RSV by PCR    2. History of seasonal allergies  - benzonatate (TESSALON) 100 MG Cap; Take 1 Capsule by mouth 3 times a day as needed for Cough.  Dispense: 30 Capsule; Refill: 0  - fluticasone (FLONASE) 50 MCG/ACT nasal spray; Administer 1 Spray into affected nostril(S) every day.  Dispense: 16 g; Refill: 0  Results for orders placed or performed in visit on 04/12/24   POCT CoV-2, Flu A/B, RSV by PCR   Result Value Ref Range    SARS-CoV-2 by PCR Negative Negative, Invalid    Influenza virus A RNA Negative Negative, Invalid    Influenza virus B, PCR Negative Negative, Invalid    RSV, PCR Negative Negative, Invalid   Symptomatic care.  -Oral hydration and rest.   -Cough control: nonpharmacologic options for cough relief such as throat lozenges, hot tea, honey.  -Over the counter expectorant as directed; Guaifenesin (Mucinex).  -Tylenol or ibuprofen for pain and fever as directed.   -Warm salt water gargles.  -OTC Throat lozenges or spray (Cepacol).  -Nasal spray and allergy medications as directed (Zyrtec or Loratadine).  -You may try saline irrigation or neti pot.    -Warm compress to sinuses.     Seek emergency medical care immediately for: Trouble breathing, persistent pain or pressure in the chest, confusion, inability to wake or stay awake, bluish lips or face, persistent tachycardia (fast heart rate), prolonged dizziness, persistent high grade fevers. Follow up for prolonged cough, persistent wheezing, persistent throat pain, difficulty swallowing, persistent fevers, leg swelling, persistent sinus symptoms, or any other concerns. Follow up with your Primary Care Provider.     -Presents with acute URI symptoms x 2 days. Discussed viral etiology, symptomatic care, and S&S of PNA with follow up. Stable Vitals. He had also inquired about getting an allergy shot. Advised against it at this time, recommending initial OTC treatments for allergies.     Differential diagnosis, natural  history, supportive care, and indications for immediate follow-up discussed.

## 2024-04-12 NOTE — PATIENT INSTRUCTIONS
Symptomatic care.  -Oral hydration and rest.   -Cough control: nonpharmacologic options for cough relief such as throat lozenges, hot tea, honey.  -Over the counter expectorant as directed; Guaifenesin (Mucinex).  -Tylenol or ibuprofen for pain and fever as directed.   -Warm salt water gargles.  -OTC Throat lozenges or spray (Cepacol).  -Nasal spray and allergy medications as directed (Zyrtec or Loratadine).  -You may try saline irrigation or neti pot.    -Warm compress to sinuses.     Seek emergency medical care immediately for: Trouble breathing, persistent pain or pressure in the chest, confusion, inability to wake or stay awake, bluish lips or face, persistent tachycardia (fast heart rate), prolonged dizziness, persistent high grade fevers. Follow up for prolonged cough, persistent wheezing, persistent throat pain, difficulty swallowing, persistent fevers, leg swelling, persistent sinus symptoms, or any other concerns. Follow up with your Primary Care Provider.

## 2024-04-15 PROBLEM — M54.2 CERVICALGIA: Status: ACTIVE | Noted: 2024-04-15

## 2024-05-09 ENCOUNTER — OFFICE VISIT (OUTPATIENT)
Dept: URGENT CARE | Facility: PHYSICIAN GROUP | Age: 44
End: 2024-05-09
Payer: COMMERCIAL

## 2024-05-09 VITALS
SYSTOLIC BLOOD PRESSURE: 112 MMHG | HEART RATE: 88 BPM | WEIGHT: 224 LBS | TEMPERATURE: 96.7 F | RESPIRATION RATE: 14 BRPM | DIASTOLIC BLOOD PRESSURE: 72 MMHG | HEIGHT: 69 IN | OXYGEN SATURATION: 96 % | BODY MASS INDEX: 33.18 KG/M2

## 2024-05-09 DIAGNOSIS — K21.9 GASTROESOPHAGEAL REFLUX DISEASE, UNSPECIFIED WHETHER ESOPHAGITIS PRESENT: ICD-10-CM

## 2024-05-09 PROCEDURE — 99213 OFFICE O/P EST LOW 20 MIN: CPT | Performed by: NURSE PRACTITIONER

## 2024-05-09 PROCEDURE — 3074F SYST BP LT 130 MM HG: CPT | Performed by: NURSE PRACTITIONER

## 2024-05-09 PROCEDURE — 3078F DIAST BP <80 MM HG: CPT | Performed by: NURSE PRACTITIONER

## 2024-05-09 RX ORDER — FLUOXETINE HYDROCHLORIDE 20 MG/1
20 CAPSULE ORAL
COMMUNITY
Start: 2024-02-06

## 2024-05-09 RX ORDER — OMEPRAZOLE 20 MG/1
CAPSULE, DELAYED RELEASE ORAL
Qty: 30 CAPSULE | Refills: 0 | Status: SHIPPED | OUTPATIENT
Start: 2024-05-09

## 2024-05-09 RX ORDER — BUPROPION HYDROCHLORIDE 150 MG/1
150 TABLET ORAL
COMMUNITY
Start: 2024-03-19

## 2024-05-09 NOTE — PROGRESS NOTES
"Subjective:   John Domínguez is a 43 y.o. male who presents for Seasonal Allergies (Was seen 4/12 not sure if he's having seasonal allergies, has weird sensation in Throat, )        Patient is a 43-year-old male presents clinic today reporting 3-week history of feeling like there is a ball or something possibly stuck in his throat, mild nausea, and acid reflux intermittently.    He states that the symptoms are worse after eating and drinking.  He has not had any fevers, blood or mucus in his stools, or any vomiting.  Patient did think initially that it was allergy symptoms and was trying Benadryl without any symptom improvement.  Patient denies any alcohol use or tobacco use.    Medications, Allergies, and current problem list reviewed today in Epic.     Objective:     /72   Pulse 88   Temp 35.9 °C (96.7 °F) (Temporal)   Resp 14   Ht 1.753 m (5' 9\")   Wt 102 kg (224 lb)   SpO2 96%     Physical Exam  Vitals reviewed.   Constitutional:       General: He is not in acute distress.     Appearance: Normal appearance. He is not ill-appearing or toxic-appearing.   HENT:      Head: Normocephalic.      Nose: Nose normal.      Mouth/Throat:      Mouth: Mucous membranes are moist.   Eyes:      Extraocular Movements: Extraocular movements intact.      Conjunctiva/sclera: Conjunctivae normal.      Pupils: Pupils are equal, round, and reactive to light.   Cardiovascular:      Rate and Rhythm: Normal rate.   Pulmonary:      Effort: Pulmonary effort is normal.   Abdominal:      General: Abdomen is flat. Bowel sounds are normal. There is no distension.      Palpations: Abdomen is soft.      Tenderness: There is no abdominal tenderness. There is no guarding or rebound.   Musculoskeletal:         General: Normal range of motion.      Cervical back: Normal range of motion and neck supple.   Skin:     General: Skin is warm and dry.   Neurological:      General: No focal deficit present.      Mental Status: He is alert and " oriented to person, place, and time.   Psychiatric:         Mood and Affect: Mood normal.         Behavior: Behavior normal.         Assessment/Plan:     Diagnosis and associated orders:     1. Gastroesophageal reflux disease, unspecified whether esophagitis present  omeprazole (PRILOSEC) 20 MG delayed-release capsule         Comments/MDM:     Is acute condition.  Patient presents clinic today with GERD symptoms.  Education was provided about appropriate diet changes.  Omeprazole trial was sent to pharmacy.  Recommended patient follow-up with primary care provider if symptoms continue or sooner if they acutely worsen.  Patient was involved with shared decision-making throughout the exam today and verbalizes understanding regards to plan of care, discharge instructions, and follow-up         Differential diagnosis, natural history, supportive care, and indications for immediate follow-up discussed.    Advised the patient to follow-up with the primary care physician for recheck, reevaluation, and consideration of further management.    I personally reviewed prior external notes and test results pertinent to today's visit as well as additional imaging and testing completed in clinic today.     Please note that this dictation was created using voice recognition software. I have made a reasonable attempt to correct obvious errors, but I expect that there are errors of grammar and possibly content that I did not discover before finalizing the note.